# Patient Record
Sex: MALE | Race: WHITE | Employment: FULL TIME | URBAN - NONMETROPOLITAN AREA
[De-identification: names, ages, dates, MRNs, and addresses within clinical notes are randomized per-mention and may not be internally consistent; named-entity substitution may affect disease eponyms.]

---

## 2017-07-17 ENCOUNTER — TELEPHONE (OUTPATIENT)
Dept: CARDIOLOGY | Age: 62
End: 2017-07-17

## 2017-07-18 ENCOUNTER — TELEPHONE (OUTPATIENT)
Dept: CARDIOLOGY | Age: 62
End: 2017-07-18

## 2017-08-03 ENCOUNTER — OFFICE VISIT (OUTPATIENT)
Dept: CARDIOLOGY | Age: 62
End: 2017-08-03
Payer: COMMERCIAL

## 2017-08-03 VITALS
HEART RATE: 70 BPM | WEIGHT: 251 LBS | DIASTOLIC BLOOD PRESSURE: 70 MMHG | SYSTOLIC BLOOD PRESSURE: 120 MMHG | BODY MASS INDEX: 38.04 KG/M2 | HEIGHT: 68 IN

## 2017-08-03 DIAGNOSIS — I51.7 LEFT VENTRICULAR HYPERTROPHY: Primary | ICD-10-CM

## 2017-08-03 DIAGNOSIS — I51.89 DIASTOLIC DYSFUNCTION: ICD-10-CM

## 2017-08-03 DIAGNOSIS — I10 ESSENTIAL HYPERTENSION: ICD-10-CM

## 2017-08-03 PROCEDURE — 3017F COLORECTAL CA SCREEN DOC REV: CPT | Performed by: INTERNAL MEDICINE

## 2017-08-03 PROCEDURE — 1036F TOBACCO NON-USER: CPT | Performed by: INTERNAL MEDICINE

## 2017-08-03 PROCEDURE — G8427 DOCREV CUR MEDS BY ELIG CLIN: HCPCS | Performed by: INTERNAL MEDICINE

## 2017-08-03 PROCEDURE — 99213 OFFICE O/P EST LOW 20 MIN: CPT | Performed by: INTERNAL MEDICINE

## 2017-08-03 PROCEDURE — G8598 ASA/ANTIPLAT THER USED: HCPCS | Performed by: INTERNAL MEDICINE

## 2017-08-03 PROCEDURE — G8417 CALC BMI ABV UP PARAM F/U: HCPCS | Performed by: INTERNAL MEDICINE

## 2018-02-06 ENCOUNTER — TELEPHONE (OUTPATIENT)
Dept: CARDIOLOGY | Age: 63
End: 2018-02-06

## 2018-02-07 ENCOUNTER — OFFICE VISIT (OUTPATIENT)
Dept: CARDIOLOGY | Age: 63
End: 2018-02-07
Payer: COMMERCIAL

## 2018-02-07 VITALS
HEART RATE: 71 BPM | DIASTOLIC BLOOD PRESSURE: 88 MMHG | BODY MASS INDEX: 38.8 KG/M2 | HEIGHT: 68 IN | SYSTOLIC BLOOD PRESSURE: 138 MMHG | WEIGHT: 256 LBS

## 2018-02-07 DIAGNOSIS — I10 ESSENTIAL HYPERTENSION: Primary | ICD-10-CM

## 2018-02-07 DIAGNOSIS — I51.89 DIASTOLIC DYSFUNCTION: ICD-10-CM

## 2018-02-07 DIAGNOSIS — I51.7 LEFT VENTRICULAR HYPERTROPHY: ICD-10-CM

## 2018-02-07 PROCEDURE — 3017F COLORECTAL CA SCREEN DOC REV: CPT | Performed by: CLINICAL NURSE SPECIALIST

## 2018-02-07 PROCEDURE — 1036F TOBACCO NON-USER: CPT | Performed by: CLINICAL NURSE SPECIALIST

## 2018-02-07 PROCEDURE — G8427 DOCREV CUR MEDS BY ELIG CLIN: HCPCS | Performed by: CLINICAL NURSE SPECIALIST

## 2018-02-07 PROCEDURE — G8598 ASA/ANTIPLAT THER USED: HCPCS | Performed by: CLINICAL NURSE SPECIALIST

## 2018-02-07 PROCEDURE — G8417 CALC BMI ABV UP PARAM F/U: HCPCS | Performed by: CLINICAL NURSE SPECIALIST

## 2018-02-07 PROCEDURE — 93000 ELECTROCARDIOGRAM COMPLETE: CPT | Performed by: CLINICAL NURSE SPECIALIST

## 2018-02-07 PROCEDURE — G8484 FLU IMMUNIZE NO ADMIN: HCPCS | Performed by: CLINICAL NURSE SPECIALIST

## 2018-02-07 PROCEDURE — 99213 OFFICE O/P EST LOW 20 MIN: CPT | Performed by: CLINICAL NURSE SPECIALIST

## 2018-02-07 RX ORDER — GLIPIZIDE AND METFORMIN HCL 5; 500 MG/1; MG/1
1 TABLET, FILM COATED ORAL 2 TIMES DAILY
COMMUNITY

## 2018-02-07 NOTE — PATIENT INSTRUCTIONS
Follow up in Aug With Dr. Derrell Kamara  Work on better diabetes control  Call with any questions or concerns  Follow up with [facility] for non cardiac problems  Report any new problems  Cardiovascular Fitness-Exercise as tolerated. Strive for 30 minutes of exercise most days of the week. Cardiac / Healthy Diet  Continue current medications as directed  Continue plan of treatment  It is always recommended that you bring your medications bottles with you to each visit - this is for your safety! Patient Education        Learning About Diabetes and Coronary Artery Disease  How are diabetes and heart disease connected? Many people think diabetes and heart disease go hand in hand. But having diabetes doesn't have to mean that you are going to have a heart attack someday. Healthy living can help prevent many of the problems that come with both diabetes and heart disease. For some people, diabetes can cause problems in your body that may lead to heart disease. Diabetes can make the problems of heart disease worse. Experts do not fully understand how diabetes affects the heart. Many things can lead to heart disease, including high blood sugar, insulin resistance, high cholesterol, and high blood pressure. But lifestyle and genetics may also affect a person's risk. But here's the good news: The good things you're doing to stay healthy with diabetes-eating healthy foods, quitting smoking, getting exercise and more-are also helping your heart. What increases your risk for heart disease? When you have diabetes, your risk for heart disease is even higher if you have:  · High blood pressure, which pushes blood through the arteries with too much force. Over time, this damages the walls of the arteries. · High cholesterol, which causes the buildup of a kind of fat inside the blood vessel walls. This buildup can lower blood flow to the heart muscle and raise your risk for having a heart attack.   · Kidney damage,

## 2018-07-02 ENCOUNTER — TELEPHONE (OUTPATIENT)
Dept: CARDIOLOGY | Age: 63
End: 2018-07-02

## 2018-07-02 NOTE — TELEPHONE ENCOUNTER
Unable to leave msg or speak w pt in regards to Dr Belinda Mayer retiring and appt w Belinda Mayer on 8-8-2018 needing to be rescheduled w a [facility].  If pt does not call back later on today, will try calling pt again tomorrow 7-3-2018 to renee [place]

## 2018-07-03 ENCOUNTER — TELEPHONE (OUTPATIENT)
Dept: CARDIOLOGY | Age: 63
End: 2018-07-03

## 2018-07-03 NOTE — TELEPHONE ENCOUNTER
Unable to leave msg on pt phone, pt phone is no longer working and on the other mobile device voicemail is not set up. Called pt in regards to appt brianda Post on 8-8-2018 needing to be rescheduled w MAE due to [facility].  If pt does not call back today on 7-3-2018 will call pt back on 7-5-2018    rubén

## 2018-07-09 ENCOUNTER — TELEPHONE (OUTPATIENT)
Dept: CARDIOLOGY | Age: 63
End: 2018-07-09

## 2023-01-07 ENCOUNTER — APPOINTMENT (OUTPATIENT)
Dept: GENERAL RADIOLOGY | Age: 68
DRG: 682 | End: 2023-01-07
Payer: MEDICARE

## 2023-01-07 ENCOUNTER — HOSPITAL ENCOUNTER (INPATIENT)
Age: 68
LOS: 2 days | Discharge: HOME HEALTH | DRG: 682 | End: 2023-01-10
Attending: EMERGENCY MEDICINE | Admitting: STUDENT IN AN ORGANIZED HEALTH CARE EDUCATION/TRAINING PROGRAM
Payer: MEDICARE

## 2023-01-07 DIAGNOSIS — U07.1 COVID-19 VIRUS INFECTION: Primary | ICD-10-CM

## 2023-01-07 DIAGNOSIS — N17.9 AKI (ACUTE KIDNEY INJURY) (HCC): ICD-10-CM

## 2023-01-07 DIAGNOSIS — R17 ELEVATED BILIRUBIN: ICD-10-CM

## 2023-01-07 LAB
BASOPHILS ABSOLUTE: 0 K/UL (ref 0–0.2)
BASOPHILS RELATIVE PERCENT: 0.3 % (ref 0–1)
EOSINOPHILS ABSOLUTE: 0 K/UL (ref 0–0.6)
EOSINOPHILS RELATIVE PERCENT: 0 % (ref 0–5)
GLUCOSE BLD-MCNC: 61 MG/DL (ref 70–99)
HCT VFR BLD CALC: 44 % (ref 42–52)
HEMOGLOBIN: 15 G/DL (ref 14–18)
IMMATURE GRANULOCYTES #: 0.1 K/UL
LYMPHOCYTES ABSOLUTE: 0.6 K/UL (ref 1.1–4.5)
LYMPHOCYTES RELATIVE PERCENT: 4.8 % (ref 20–40)
MCH RBC QN AUTO: 32.4 PG (ref 27–31)
MCHC RBC AUTO-ENTMCNC: 34.1 G/DL (ref 33–37)
MCV RBC AUTO: 95 FL (ref 80–94)
MONOCYTES ABSOLUTE: 1.2 K/UL (ref 0–0.9)
MONOCYTES RELATIVE PERCENT: 9.8 % (ref 0–10)
NEUTROPHILS ABSOLUTE: 9.8 K/UL (ref 1.5–7.5)
NEUTROPHILS RELATIVE PERCENT: 83.9 % (ref 50–65)
PDW BLD-RTO: 17.2 % (ref 11.5–14.5)
PERFORMED ON: ABNORMAL
PLATELET # BLD: 158 K/UL (ref 130–400)
PMV BLD AUTO: 9.9 FL (ref 9.4–12.4)
RBC # BLD: 4.63 M/UL (ref 4.7–6.1)
WBC # BLD: 11.7 K/UL (ref 4.8–10.8)

## 2023-01-07 PROCEDURE — 99285 EMERGENCY DEPT VISIT HI MDM: CPT

## 2023-01-07 PROCEDURE — 71045 X-RAY EXAM CHEST 1 VIEW: CPT

## 2023-01-07 PROCEDURE — 93005 ELECTROCARDIOGRAM TRACING: CPT | Performed by: EMERGENCY MEDICINE

## 2023-01-07 RX ORDER — METHYLPREDNISOLONE 4 MG/1
4 TABLET ORAL SEE ADMIN INSTRUCTIONS
Status: ON HOLD | COMMUNITY
End: 2023-01-10 | Stop reason: HOSPADM

## 2023-01-07 RX ORDER — DULAGLUTIDE 1.5 MG/.5ML
1.5 INJECTION, SOLUTION SUBCUTANEOUS WEEKLY
COMMUNITY

## 2023-01-07 RX ORDER — PREDNISONE 1 MG/1
5 TABLET ORAL DAILY
Status: ON HOLD | COMMUNITY
End: 2023-01-10 | Stop reason: HOSPADM

## 2023-01-07 RX ORDER — VALSARTAN 40 MG/1
40 TABLET ORAL DAILY
COMMUNITY

## 2023-01-07 RX ORDER — ALLOPURINOL 300 MG/1
300 TABLET ORAL DAILY
COMMUNITY

## 2023-01-07 RX ORDER — AZITHROMYCIN 250 MG/1
250 TABLET, FILM COATED ORAL DAILY
Status: ON HOLD | COMMUNITY
End: 2023-01-10 | Stop reason: HOSPADM

## 2023-01-07 ASSESSMENT — PAIN - FUNCTIONAL ASSESSMENT: PAIN_FUNCTIONAL_ASSESSMENT: NONE - DENIES PAIN

## 2023-01-08 ENCOUNTER — APPOINTMENT (OUTPATIENT)
Dept: CT IMAGING | Age: 68
DRG: 682 | End: 2023-01-08
Payer: MEDICARE

## 2023-01-08 PROBLEM — R74.01 TRANSAMINITIS: Status: ACTIVE | Noted: 2023-01-08

## 2023-01-08 LAB
ALBUMIN SERPL-MCNC: 2.8 G/DL (ref 3.5–5.2)
ALBUMIN SERPL-MCNC: 3.2 G/DL (ref 3.5–5.2)
ALP BLD-CCNC: 170 U/L (ref 40–130)
ALP BLD-CCNC: 186 U/L (ref 40–130)
ALT SERPL-CCNC: 70 U/L (ref 5–41)
ALT SERPL-CCNC: 76 U/L (ref 5–41)
ANION GAP SERPL CALCULATED.3IONS-SCNC: 11 MMOL/L (ref 7–19)
ANION GAP SERPL CALCULATED.3IONS-SCNC: 11 MMOL/L (ref 7–19)
AST SERPL-CCNC: 114 U/L (ref 5–40)
AST SERPL-CCNC: 96 U/L (ref 5–40)
BASE EXCESS ARTERIAL: 5.2 MMOL/L (ref -2–2)
BILIRUB SERPL-MCNC: 2.4 MG/DL (ref 0.2–1.2)
BILIRUB SERPL-MCNC: 2.8 MG/DL (ref 0.2–1.2)
BILIRUBIN URINE: NEGATIVE
BLOOD, URINE: NEGATIVE
BUN BLDV-MCNC: 32 MG/DL (ref 8–23)
BUN BLDV-MCNC: 35 MG/DL (ref 8–23)
CALCIUM SERPL-MCNC: 8.4 MG/DL (ref 8.8–10.2)
CALCIUM SERPL-MCNC: 8.8 MG/DL (ref 8.8–10.2)
CARBOXYHEMOGLOBIN ARTERIAL: 1.5 % (ref 0–5)
CHLORIDE BLD-SCNC: 101 MMOL/L (ref 98–111)
CHLORIDE BLD-SCNC: 101 MMOL/L (ref 98–111)
CLARITY: CLEAR
CO2: 28 MMOL/L (ref 22–29)
CO2: 29 MMOL/L (ref 22–29)
COLOR: YELLOW
CREAT SERPL-MCNC: 1.2 MG/DL (ref 0.5–1.2)
CREAT SERPL-MCNC: 1.3 MG/DL (ref 0.5–1.2)
FERRITIN: 280.1 NG/ML (ref 30–400)
GFR SERPL CREATININE-BSD FRML MDRD: >60 ML/MIN/{1.73_M2}
GFR SERPL CREATININE-BSD FRML MDRD: >60 ML/MIN/{1.73_M2}
GLUCOSE BLD-MCNC: 107 MG/DL (ref 74–109)
GLUCOSE BLD-MCNC: 132 MG/DL (ref 70–99)
GLUCOSE BLD-MCNC: 48 MG/DL (ref 70–99)
GLUCOSE BLD-MCNC: 61 MG/DL (ref 74–109)
GLUCOSE BLD-MCNC: 79 MG/DL (ref 70–99)
GLUCOSE BLD-MCNC: 93 MG/DL (ref 70–99)
GLUCOSE URINE: NEGATIVE MG/DL
HBA1C MFR BLD: 5.6 % (ref 4–6)
HCO3 ARTERIAL: 29 MMOL/L (ref 22–26)
HEMOGLOBIN, ART, EXTENDED: 14.2 G/DL (ref 14–18)
KETONES, URINE: NEGATIVE MG/DL
LEUKOCYTE ESTERASE, URINE: NEGATIVE
METHEMOGLOBIN ARTERIAL: 1 %
NITRITE, URINE: NEGATIVE
O2 CONTENT ARTERIAL: 18.2 ML/DL
O2 SAT, ARTERIAL: 91.3 %
O2 THERAPY: ABNORMAL
PCO2 ARTERIAL: 39 MMHG (ref 35–45)
PERFORMED ON: ABNORMAL
PERFORMED ON: ABNORMAL
PERFORMED ON: NORMAL
PERFORMED ON: NORMAL
PH ARTERIAL: 7.48 (ref 7.35–7.45)
PH UA: 6.5 (ref 5–8)
PO2 ARTERIAL: 64 MMHG (ref 80–100)
POTASSIUM SERPL-SCNC: 3.5 MMOL/L (ref 3.5–5)
POTASSIUM SERPL-SCNC: 4 MMOL/L (ref 3.5–5)
POTASSIUM, WHOLE BLOOD: 3.7
PRO-BNP: 669 PG/ML (ref 0–900)
PROCALCITONIN: 0.34 NG/ML (ref 0–0.09)
PROTEIN UA: NEGATIVE MG/DL
SAMPLE SOURCE: ABNORMAL
SARS-COV-2, NAAT: DETECTED
SODIUM BLD-SCNC: 140 MMOL/L (ref 136–145)
SODIUM BLD-SCNC: 141 MMOL/L (ref 136–145)
SPECIFIC GRAVITY UA: 1.03 (ref 1–1.03)
TOTAL PROTEIN: 5.6 G/DL (ref 6.6–8.7)
TOTAL PROTEIN: 7 G/DL (ref 6.6–8.7)
UROBILINOGEN, URINE: 1 E.U./DL
VITAMIN D 25-HYDROXY: 28.5 NG/ML

## 2023-01-08 PROCEDURE — 94760 N-INVAS EAR/PLS OXIMETRY 1: CPT

## 2023-01-08 PROCEDURE — 36600 WITHDRAWAL OF ARTERIAL BLOOD: CPT

## 2023-01-08 PROCEDURE — 6370000000 HC RX 637 (ALT 250 FOR IP): Performed by: HOSPITALIST

## 2023-01-08 PROCEDURE — 6360000002 HC RX W HCPCS: Performed by: HOSPITALIST

## 2023-01-08 PROCEDURE — 82728 ASSAY OF FERRITIN: CPT

## 2023-01-08 PROCEDURE — 85025 COMPLETE CBC W/AUTO DIFF WBC: CPT

## 2023-01-08 PROCEDURE — 83036 HEMOGLOBIN GLYCOSYLATED A1C: CPT

## 2023-01-08 PROCEDURE — 84145 PROCALCITONIN (PCT): CPT

## 2023-01-08 PROCEDURE — 6360000004 HC RX CONTRAST MEDICATION: Performed by: EMERGENCY MEDICINE

## 2023-01-08 PROCEDURE — 82947 ASSAY GLUCOSE BLOOD QUANT: CPT

## 2023-01-08 PROCEDURE — 2580000003 HC RX 258: Performed by: EMERGENCY MEDICINE

## 2023-01-08 PROCEDURE — 1210000000 HC MED SURG R&B

## 2023-01-08 PROCEDURE — 82803 BLOOD GASES ANY COMBINATION: CPT

## 2023-01-08 PROCEDURE — 74177 CT ABD & PELVIS W/CONTRAST: CPT

## 2023-01-08 PROCEDURE — 83880 ASSAY OF NATRIURETIC PEPTIDE: CPT

## 2023-01-08 PROCEDURE — 36415 COLL VENOUS BLD VENIPUNCTURE: CPT

## 2023-01-08 PROCEDURE — 2580000003 HC RX 258: Performed by: HOSPITALIST

## 2023-01-08 PROCEDURE — 87635 SARS-COV-2 COVID-19 AMP PRB: CPT

## 2023-01-08 PROCEDURE — 80053 COMPREHEN METABOLIC PANEL: CPT

## 2023-01-08 PROCEDURE — 82306 VITAMIN D 25 HYDROXY: CPT

## 2023-01-08 PROCEDURE — 81003 URINALYSIS AUTO W/O SCOPE: CPT

## 2023-01-08 RX ORDER — SODIUM CHLORIDE 9 MG/ML
INJECTION, SOLUTION INTRAVENOUS PRN
Status: DISCONTINUED | OUTPATIENT
Start: 2023-01-08 | End: 2023-01-10 | Stop reason: HOSPADM

## 2023-01-08 RX ORDER — POLYETHYLENE GLYCOL 3350 17 G/17G
17 POWDER, FOR SOLUTION ORAL DAILY PRN
Status: DISCONTINUED | OUTPATIENT
Start: 2023-01-08 | End: 2023-01-10 | Stop reason: HOSPADM

## 2023-01-08 RX ORDER — ACETAMINOPHEN 650 MG/1
650 SUPPOSITORY RECTAL EVERY 4 HOURS PRN
Status: DISCONTINUED | OUTPATIENT
Start: 2023-01-08 | End: 2023-01-10 | Stop reason: HOSPADM

## 2023-01-08 RX ORDER — ONDANSETRON 2 MG/ML
4 INJECTION INTRAMUSCULAR; INTRAVENOUS EVERY 6 HOURS PRN
Status: DISCONTINUED | OUTPATIENT
Start: 2023-01-08 | End: 2023-01-10 | Stop reason: HOSPADM

## 2023-01-08 RX ORDER — CALCIUM CARBONATE 200(500)MG
500 TABLET,CHEWABLE ORAL 3 TIMES DAILY PRN
Status: DISCONTINUED | OUTPATIENT
Start: 2023-01-08 | End: 2023-01-10 | Stop reason: HOSPADM

## 2023-01-08 RX ORDER — ACETAMINOPHEN 325 MG/1
650 TABLET ORAL EVERY 4 HOURS PRN
Status: DISCONTINUED | OUTPATIENT
Start: 2023-01-08 | End: 2023-01-10 | Stop reason: HOSPADM

## 2023-01-08 RX ORDER — GUAIFENESIN/DEXTROMETHORPHAN 100-10MG/5
10 SYRUP ORAL EVERY 4 HOURS PRN
Status: DISCONTINUED | OUTPATIENT
Start: 2023-01-08 | End: 2023-01-10 | Stop reason: HOSPADM

## 2023-01-08 RX ORDER — SODIUM CHLORIDE, SODIUM LACTATE, POTASSIUM CHLORIDE, AND CALCIUM CHLORIDE .6; .31; .03; .02 G/100ML; G/100ML; G/100ML; G/100ML
1000 INJECTION, SOLUTION INTRAVENOUS ONCE
Status: COMPLETED | OUTPATIENT
Start: 2023-01-08 | End: 2023-01-08

## 2023-01-08 RX ORDER — COLCHICINE 0.6 MG/1
0.6 TABLET ORAL DAILY
Status: DISCONTINUED | OUTPATIENT
Start: 2023-01-08 | End: 2023-01-08

## 2023-01-08 RX ORDER — ONDANSETRON 4 MG/1
4 TABLET, ORALLY DISINTEGRATING ORAL EVERY 8 HOURS PRN
Status: DISCONTINUED | OUTPATIENT
Start: 2023-01-08 | End: 2023-01-10 | Stop reason: HOSPADM

## 2023-01-08 RX ORDER — GLIPIZIDE 5 MG/1
5 TABLET ORAL
Status: DISCONTINUED | OUTPATIENT
Start: 2023-01-08 | End: 2023-01-09

## 2023-01-08 RX ORDER — MECOBALAMIN 5000 MCG
5 TABLET,DISINTEGRATING ORAL NIGHTLY PRN
Status: DISCONTINUED | OUTPATIENT
Start: 2023-01-08 | End: 2023-01-10 | Stop reason: HOSPADM

## 2023-01-08 RX ORDER — ENOXAPARIN SODIUM 100 MG/ML
30 INJECTION SUBCUTANEOUS 2 TIMES DAILY
Status: DISCONTINUED | OUTPATIENT
Start: 2023-01-08 | End: 2023-01-10 | Stop reason: HOSPADM

## 2023-01-08 RX ORDER — DEXTROSE MONOHYDRATE 100 MG/ML
INJECTION, SOLUTION INTRAVENOUS CONTINUOUS PRN
Status: DISCONTINUED | OUTPATIENT
Start: 2023-01-08 | End: 2023-01-10 | Stop reason: HOSPADM

## 2023-01-08 RX ORDER — VALSARTAN 40 MG/1
40 TABLET ORAL DAILY
Status: DISCONTINUED | OUTPATIENT
Start: 2023-01-08 | End: 2023-01-10 | Stop reason: HOSPADM

## 2023-01-08 RX ORDER — SODIUM CHLORIDE 0.9 % (FLUSH) 0.9 %
5-40 SYRINGE (ML) INJECTION PRN
Status: DISCONTINUED | OUTPATIENT
Start: 2023-01-08 | End: 2023-01-10 | Stop reason: HOSPADM

## 2023-01-08 RX ORDER — CLONIDINE HYDROCHLORIDE 0.1 MG/1
0.2 TABLET ORAL 3 TIMES DAILY
Status: DISCONTINUED | OUTPATIENT
Start: 2023-01-08 | End: 2023-01-10 | Stop reason: HOSPADM

## 2023-01-08 RX ORDER — GLIPIZIDE AND METFORMIN HCL 5; 500 MG/1; MG/1
1 TABLET, FILM COATED ORAL 2 TIMES DAILY
Status: DISCONTINUED | OUTPATIENT
Start: 2023-01-08 | End: 2023-01-08 | Stop reason: SDUPTHER

## 2023-01-08 RX ORDER — ALLOPURINOL 100 MG/1
300 TABLET ORAL DAILY
Status: DISCONTINUED | OUTPATIENT
Start: 2023-01-08 | End: 2023-01-10 | Stop reason: HOSPADM

## 2023-01-08 RX ORDER — METOPROLOL TARTRATE 50 MG/1
200 TABLET, FILM COATED ORAL 2 TIMES DAILY
Status: DISCONTINUED | OUTPATIENT
Start: 2023-01-08 | End: 2023-01-10 | Stop reason: HOSPADM

## 2023-01-08 RX ORDER — ATORVASTATIN CALCIUM 20 MG/1
20 TABLET, FILM COATED ORAL DAILY
Status: DISCONTINUED | OUTPATIENT
Start: 2023-01-08 | End: 2023-01-08

## 2023-01-08 RX ORDER — SODIUM CHLORIDE 0.9 % (FLUSH) 0.9 %
5-40 SYRINGE (ML) INJECTION EVERY 12 HOURS SCHEDULED
Status: DISCONTINUED | OUTPATIENT
Start: 2023-01-08 | End: 2023-01-10 | Stop reason: HOSPADM

## 2023-01-08 RX ORDER — AMLODIPINE BESYLATE 10 MG/1
10 TABLET ORAL DAILY
Status: DISCONTINUED | OUTPATIENT
Start: 2023-01-08 | End: 2023-01-10 | Stop reason: HOSPADM

## 2023-01-08 RX ADMIN — SODIUM CHLORIDE, PRESERVATIVE FREE 10 ML: 5 INJECTION INTRAVENOUS at 20:23

## 2023-01-08 RX ADMIN — SODIUM CHLORIDE, POTASSIUM CHLORIDE, SODIUM LACTATE AND CALCIUM CHLORIDE 1000 ML: 600; 310; 30; 20 INJECTION, SOLUTION INTRAVENOUS at 00:25

## 2023-01-08 RX ADMIN — CLONIDINE HYDROCHLORIDE 0.2 MG: 0.1 TABLET ORAL at 22:08

## 2023-01-08 RX ADMIN — IOPAMIDOL 75 ML: 755 INJECTION, SOLUTION INTRAVENOUS at 01:13

## 2023-01-08 RX ADMIN — CLONIDINE HYDROCHLORIDE 0.2 MG: 0.1 TABLET ORAL at 14:32

## 2023-01-08 RX ADMIN — SODIUM CHLORIDE, PRESERVATIVE FREE 10 ML: 5 INJECTION INTRAVENOUS at 10:26

## 2023-01-08 RX ADMIN — METOPROLOL TARTRATE 200 MG: 50 TABLET, FILM COATED ORAL at 20:24

## 2023-01-08 RX ADMIN — ENOXAPARIN SODIUM 30 MG: 100 INJECTION SUBCUTANEOUS at 20:24

## 2023-01-08 RX ADMIN — ALLOPURINOL 300 MG: 100 TABLET ORAL at 10:23

## 2023-01-08 RX ADMIN — AMLODIPINE BESYLATE 10 MG: 10 TABLET ORAL at 10:23

## 2023-01-08 RX ADMIN — CLONIDINE HYDROCHLORIDE 0.2 MG: 0.1 TABLET ORAL at 10:23

## 2023-01-08 RX ADMIN — GLIPIZIDE 5 MG: 5 TABLET ORAL at 10:23

## 2023-01-08 RX ADMIN — METOPROLOL TARTRATE 200 MG: 50 TABLET, FILM COATED ORAL at 10:23

## 2023-01-08 RX ADMIN — METFORMIN HYDROCHLORIDE 500 MG: 500 TABLET ORAL at 10:23

## 2023-01-08 RX ADMIN — ENOXAPARIN SODIUM 30 MG: 100 INJECTION SUBCUTANEOUS at 10:24

## 2023-01-08 RX ADMIN — VALSARTAN 40 MG: 40 TABLET ORAL at 10:23

## 2023-01-08 ASSESSMENT — ENCOUNTER SYMPTOMS
COUGH: 1
VOMITING: 0
SHORTNESS OF BREATH: 1
NAUSEA: 0
SHORTNESS OF BREATH: 0
DIARRHEA: 0

## 2023-01-08 NOTE — H&P
[facility] History and Physical    Patient Information:  Patient: Milton Koehelr  MRN: [id number]   Acct: [de-identified]  YOB: 1955  Admit Date: 1/8/2023   Primary Care Physician: Dolores Chavez  Advance Directive: Full Code  Health Care Proxy: his wife, Mrs. Bonita Freitas, [phone]        SUBJECTIVE:    Chief Complaint   Patient presents with    Shortness of Breath     Covid + 2 weeks ago, family reports shortness of breath and increased weakness     EP Sign Out:  73 yo man  Satting 91% on RA  Bili was up to 2.8  LFTs to low 100s  CT showed no  pericholecystic fluid, but thickened wall - Rds recommended US  Cr is at baseline 1.3     HPI:  Mr. Milton Koehler is a pleasant 74year old  [place] man from home. He states that this is the thirteenth day that he has been sick, He had been starting to feel better but then got worse. He was never vaccinated for COVID. He states that he had first feeling sick 15 cortney\ys go which was Dec 27th. He was first tested for COVID here tonight. His wife states that he refused to let his wife test him at home. He states that he has no home oximeter. He states that that is all there is to tell. Was 91& on AR in ED but wa sent up on O2. Stayed 94-95% for me on RA. Does not have hypoxemia or need steroids at this time. Review of Systems:   Review of Systems   Constitutional:  Positive for chills and fatigue. Negative for diaphoresis and fever. Respiratory:  Negative for shortness of breath. Cardiovascular:  Negative for chest pain. Gastrointestinal:  Negative for diarrhea, nausea and vomiting. Musculoskeletal:  Negative for arthralgias and myalgias. Neurological:  Positive for weakness. ENDORSES degussia, ENDORSES anosmia   Psychiatric/Behavioral:  Negative for confusion.       Past Medical History:   Diagnosis Date    AA (alcohol abuse)     since 1994    Chronic kidney disease (CKD), stage III (moderate) ([facility] 06/14/2011    sees Dr. Dylan Solano    COVID-19 12/27/2022    Diabetes mellitus ([facility]     NIDDM    Diastolic CHF ([facility] 96/09/2440    History of CVA (cerebrovascular accident) 06/14/2011    Hyperkalemia 06/14/2011    secondary to aldactone and zestril    Hyperlipemia     cholesterol management, Royer Poole APRN    Hypertension     Left ventricular hypertrophy 06/14/2011    Lung nodule     calcified nodule RML    Tobacco abuse, in remission     since 1994    Vertebral artery stenosis 06/14/2011    Vitamin D deficiency      Past Psychiatric History:  Denies any    Past Surgical History:  Never had surgery  History reviewed. No pertinent surgical history. Social History       Tobacco History       Smoking Status  Former Smoking Start Date  7/23/1973 Quit Date  7/23/1994 Smoking Frequency  1 pack/day for 21.00 years (21.00 pk-yrs)    Smoking Tobacco Type  Cigarettes from 7/23/1973 to 7/23/1994      Smokeless Tobacco Use  Former              Alcohol History       Alcohol Use Status  Not Currently Comment  was a social drinker              Drug Use       Drug Use Status  Never              Sexual Activity       Sexually Active  Not Asked Comment  has 2 sons, here with his wife             CODE STATUS: Full Code  HEALTH CARE PROXY: his wife, Mrs. Anton Rossi, [phone]  AMBULATES: usually uses a walker  DOMICILED: has no steps in the  home, has a step to the front porch and a ramp on the patio     Family History   Problem Relation Age of Onset    Kidney Disease Mother     Hypertension Mother     Alcohol Abuse Father     Cancer Brother         smoker    Diabetes Brother     No Known Problems Son     No Known Problems Son      Allergies   Allergen Reactions    Lisinopril Cough     Home Medications:  Prior to Admission medications    Medication Sig Start Date End Date Taking?  Authorizing Provider   predniSONE (DELTASONE) 5 MG tablet Take 5 mg by mouth daily Daily as needed   Yes Historical Provider, MD   COLCHICINE PO Take 0.6 mg by mouth   Yes Historical Provider, MD   dulaglutide (TRULICITY) 1.5 PQ/3.2KA SC injection Inject 1.5 mg into the skin once a week   Yes Historical Provider, MD   allopurinol (ZYLOPRIM) 300 MG tablet Take 300 mg by mouth daily   Yes Historical Provider, MD   valsartan (DIOVAN) 40 MG tablet Take 40 mg by mouth daily   Yes Historical Provider, MD   azithromycin (ZITHROMAX) 250 MG tablet Take 250 mg by mouth daily 2 tablets on first day, then 1 daily for 4 days   Yes Historical Provider, MD   methylPREDNISolone (MEDROL DOSEPACK) 4 MG tablet Take 4 mg by mouth See Admin Instructions 6 tablets day 1, decrease by 1 tab daily for total of 6 days   Yes Historical Provider, MD   ferrous gluconate (FERGON) 225 (27 Fe) MG tablet Take by mouth daily  Patient not taking: Reported on 1/7/2023    Historical Provider, MD   glipiZIDE-metformin (METAGLIP) 5-500 MG per tablet Take 1 tablet by mouth 2 times daily    Historical Provider, MD   atorvastatin (LIPITOR) 20 MG tablet Take 1 tablet by mouth daily 6/21/16   FADIA Payne   metoprolol (LOPRESSOR) 100 MG tablet Take 100 mg by mouth Take 2 tablets twice a day    Historical Provider, MD   indomethacin (INDOCIN) 50 MG capsule   Take 50 mg by mouth 3 times daily as needed   Patient not taking: Reported on 1/7/2023 1/19/15   Historical Provider, MD   losartan (COZAAR) 25 MG tablet Take 1 tablet by mouth daily. 1/25/12 1/24/13  FADIA La   amlodipine (NORVASC) 10 MG tablet Take 10 mg by mouth daily. Historical Provider, MD   clonidine (CATAPRES) 0.2 MG tablet Take 0.2 mg by mouth three times daily.       Historical Provider, MD         OBJECTIVE:    Vitals:    01/08/23 0648   BP: (!) 142/71   Pulse: 77   Resp: 22   Temp: 98.1 °F (36.7 °C)   SpO2: 91%   Breathing in NC when seen on zee    BP (!) 142/71   Pulse 77   Temp 98.1 °F (36.7 °C)   Resp 22   Ht 5' 8\" (1.727 m)   Wt 194 lb (88 kg)   SpO2 91%   BMI 29.50 kg/m²     No intake or output data in the 24 hours ending 01/08/23 0658    Physical Exam  Vitals reviewed. Constitutional:       General: He is not in acute distress. Appearance: Normal appearance. He is ill-appearing. He is not toxic-appearing. HENT:      Head: Normocephalic and atraumatic. Nose: No congestion or rhinorrhea. Eyes:      General:         Right eye: No discharge. Left eye: No discharge. Neck:      Comments: Supple, trachea appears midline  Cardiovascular:      Rate and Rhythm: Normal rate and regular rhythm. Heart sounds: No murmur heard. No friction rub. No gallop. Pulmonary:      Effort: Pulmonary effort is normal. No respiratory distress. Breath sounds: No stridor. No wheezing, rhonchi or rales. Chest:      Chest wall: No tenderness. Abdominal:      General: Bowel sounds are normal.      Palpations: Abdomen is soft. Tenderness: There is no abdominal tenderness. There is no guarding or rebound. Musculoskeletal:      Right lower leg: No edema. Left lower leg: No edema. Skin:     General: Skin is warm. Comments: nondiaphoretic   Neurological:      Mental Status: He is alert. Motor: No tremor or seizure activity.    Psychiatric:         Mood and Affect: Mood normal.         Behavior: Behavior normal.       LABORATORY DATA:    CBC:   Recent Labs     01/07/23 2350   WBC 11.7*   HGB 15.0   HCT 44.0        BMP:   Recent Labs     01/07/23 2350 01/08/23 0041     --    K 4.0 3.7     --    CO2 29  --    BUN 35*  --    CREATININE 1.3*  --    CALCIUM 8.8  --      Hepatic Profile:   Recent Labs     01/07/23 2350   *   ALT 76*   BILITOT 2.8*   ALKPHOS 186*     Pro-BNP:   Recent Labs     01/07/23 2350   PROBNP 669     ABG:   Recent Labs     01/08/23  0041   PHART 7.480*   QUK1FQJ 39.0   PO2ART 64.0*   YRJ3TWL 29.0*   BEART 5.2*   HGBAE 14.2   D3MCQECW 91.3   CARBOXHGBART 1.5     IMAGING:  CT ABDOMEN PELVIS W IV CONTRAST Additional Contrast? None  Result Date: 1/8/2023  There is some thickening of the gallbladder wall. No calcified stones. Further evaluation, consider correlation with ultrasound. There is a small amount of ascites present around the liver and in the pelvis. Electronically signed by Maryjane Palomo MD on 01-08-23 at [facility]  Result Date: 1/8/2023  There is evidence for old granulomatous disease. There is elevation left hemidiaphragm. Electronically signed by Maryjane Palomo MD on 01-08-23 at [facility]:    COVID-19/SARS-2 Infection:  Admit to the [address] [place] [address] zee / unit under hopsitalist team  Labs as per COVID protocols (CMP daily, CBC with Diff daily, CRP daily x3, Ddimer Daily x3, Vit D 25, Procal once on arrival and again two days later in the AM)  Decadron not indicated as was 91% on RA in the ED and has been 94-95% on RA on the zee so far  \"PPE Instructions\" and \"Droplet +\" Precautions as per COVID protocol  AC with Lovenox as per COVID protocol  would provide Famotidine 20mg PO BID while on steroid IF was indicated  Oxygen as per protocol  Remdesvir 200mg on day one, followed by 4 daily doses of 100mg from the next day, would be considered IF their CrCl>30 and ALSO on supplemental O2 above baseline, BUT not on HHFNC nor NIPPV nor Ventilated, AND has had COVID for less than a week   Baricitinib would be considered IF on supplemental O2 <6LPM and CRP >7.5   Actrema would be considered IF was on intensive O2 and if CRP>7.5   Vitamin D & Vitamin C & Zinc will NOT be provided for COVID as there is no shown benefit but may be provided for other reasons  Ivermectin will NOT be provided for COVID patients as it has shown worse outcomes    Chronic Medical Problems:  Continue home regimen as indicated  Puffers will NOT be subbed to NEBS, rather the opposite if indicated as per COVID protocols   allopurinol  300 mg Oral Daily    amLODIPine  10 mg Oral Daily    colchicine  0.6 mg Oral Daily    [START ON 1/14/2023] dulaglutide  1.5 mg SubCUTAneous Weekly    glipiZIDE-metFORMIN  1 tablet Oral BID    valsartan  40 mg Oral Daily    metoprolol  200 mg Oral BID    cloNIDine  0.2 mg Oral TID    atorvastatin  20 mg Oral Daily     Supportive and Prophylactic Txx:  DVT PPx: as per above   GI (PUD) PPx: as per above   PT: as per age and admitted status will be referred to PT  ADULT DIET; Regular; 4 carb choices (60 gm/meal); Low Fat/Low Chol/High Fiber/2 gm Na; Low Sodium (2 gm)  sodium chloride flush, sodium chloride, ondansetron **OR** ondansetron, acetaminophen **OR** acetaminophen, guaiFENesin-dextromethorphan, polyethylene glycol, melatonin, calcium carbonate      Care time of >45 minutes  Pt seen/examined and admitted to inpatient status. Inpatient status is used for patients with an expected LOS extending past two midnights due to medical therapy and or critical care needs, otherwise patients are placed to OBServation status. Signed:  Electronically signed by German Hawkins MD on 1/8/23 at 07:19 AM CST.

## 2023-01-08 NOTE — ED PROVIDER NOTES
[facility] EMERGENCY DEPT  eMERGENCY dEPARTMENT eNCOUnter      Pt Name: Maximiliano Bolanos  MRN: [id number]  [place] 1955  Date of evaluation: 1/7/2023  Provider: Alberto Dailey MD    CHIEF COMPLAINT       Chief Complaint   Patient presents with    Shortness of Breath     Covid + 2 weeks ago, family reports shortness of breath and increased weakness         HISTORY OF PRESENT ILLNESS   (Location/Symptom, Timing/Onset,Context/Setting, Quality, Duration, Modifying Factors, Severity)  Note limiting factors. Maximiliano Bolanos is a 74 y.o. male who presents to the emergency department for evaluation regarding increased cough and shortness of breath. Patient states he has not felt well for the past 3 to 4 days. Notes that this evening he had increased feelings of shortness of breath and decreased oral intake. Patient's wife was present with him here in the emergency department tells me that she tested positive for COVID-19 infection about 1 week previously. Patient states that he is previously unvaccinated for COVID-19 and is never previously had COVID-19 infection. Patient denies any chest pain or palpitations. He has not had any acute syncopal events. HPI    NursingNotes were reviewed. REVIEW OF SYSTEMS    (2-9 systems for level 4, 10 or more for level 5)     Review of Systems   Constitutional:  Positive for appetite change, fatigue and fever. HENT:  Positive for congestion. Respiratory:  Positive for cough and shortness of breath. Cardiovascular:  Negative for chest pain and palpitations. Neurological:  Negative for dizziness and syncope. All other systems reviewed and are negative.          PAST MEDICALHISTORY     Past Medical History:   Diagnosis Date    Acute hyperkalemia     secondary to aldactone and zestril    Chronic kidney disease (CKD), stage III (moderate) ([address] 6/14/2011    Diabetes mellitus ([facility]     NIDDM    Diastolic dysfunction 4/97/0076    History of CVA (cerebrovascular accident) 6/14/2011    Hyperkalemia 6/14/2011    Hyperlipemia     cholesterol management, Royer LOAIZA    Hypertension     Kidney disease     sees Dr. Dylan Solano    Left ventricular hypertrophy 6/14/2011    Lung nodule     calcified nodule RML    Vertebral artery stenosis 6/14/2011    Vitamin D deficiency          SURGICAL HISTORY     History reviewed. No pertinent surgical history. CURRENT MEDICATIONS     Previous Medications    ALLOPURINOL (ZYLOPRIM) 300 MG TABLET    Take 300 mg by mouth daily    AMLODIPINE (NORVASC) 10 MG TABLET    Take 10 mg by mouth daily. ATORVASTATIN (LIPITOR) 20 MG TABLET    Take 1 tablet by mouth daily    AZITHROMYCIN (ZITHROMAX) 250 MG TABLET    Take 250 mg by mouth daily 2 tablets on first day, then 1 daily for 4 days    CLONIDINE (CATAPRES) 0.2 MG TABLET    Take 0.2 mg by mouth three times daily. COLCHICINE PO    Take 0.6 mg by mouth    DULAGLUTIDE (TRULICITY) 1.5 DS/1.5RQ SC INJECTION    Inject 1.5 mg into the skin once a week    FERROUS GLUCONATE (FERGON) 225 (27 FE) MG TABLET    Take by mouth daily    GLIPIZIDE-METFORMIN (METAGLIP) 5-500 MG PER TABLET    Take 1 tablet by mouth 2 times daily    INDOMETHACIN (INDOCIN) 50 MG CAPSULE      Take 50 mg by mouth 3 times daily as needed     LOSARTAN (COZAAR) 25 MG TABLET    Take 1 tablet by mouth daily. LOSARTAN-HYDROCHLOROTHIAZIDE (HYZAAR) 100-25 MG PER TABLET    Take 1 tablet by mouth daily. METHYLPREDNISOLONE (MEDROL DOSEPACK) 4 MG TABLET    Take 4 mg by mouth See Admin Instructions 6 tablets day 1, decrease by 1 tab daily for total of 6 days    METOPROLOL (LOPRESSOR) 100 MG TABLET    Take 100 mg by mouth Take 2 tablets twice a day    PREDNISONE (DELTASONE) 5 MG TABLET    Take 5 mg by mouth daily Daily as needed    VALSARTAN (DIOVAN) 40 MG TABLET    Take 40 mg by mouth daily       ALLERGIES     Lisinopril    FAMILY HISTORY     History reviewed. No pertinent family history.        SOCIAL HISTORY       Social History     Socioeconomic History    Marital status:      Spouse name: None    Number of children: None    Years of education: None    Highest education level: None   Tobacco Use    Smoking status: Former     Packs/day: 1.00     Years: 21.00     Pack years: 21.00     Types: Cigarettes     Start date: 1973     Quit date: 1994     Years since quittin.4    Smokeless tobacco: Former   Vaping Use    Vaping Use: Never used   Substance and Sexual Activity    Alcohol use: No    Drug use: No       SCREENINGS    [place] Coma Scale  Eye Opening: Spontaneous  Best Verbal Response: Oriented  Best Motor Response: Obeys commands  [place] Coma Scale Score: 15        PHYSICAL EXAM    (up to 7 for level 4, 8 or more for level 5)     ED Triage Vitals [23 2326]   BP Temp Temp Source Heart Rate Resp SpO2 Height Weight   (!) 146/69 98.3 °F (36.8 °C) Oral 71 16 93 % 5' 8\" (1.727 m) 220 lb (99.8 kg)       Physical Exam  Vitals and nursing note reviewed. HENT:      Head: Atraumatic. Mouth/Throat:      Mouth: Mucous membranes are moist. Mucous membranes are not dry. Pharynx: No posterior oropharyngeal erythema. Eyes:      General: No scleral icterus. Pupils: Pupils are equal, round, and reactive to light. Neck:      Trachea: No tracheal deviation. Cardiovascular:      Rate and Rhythm: Normal rate and regular rhythm. Pulses: Normal pulses. Heart sounds: Normal heart sounds. No murmur heard. Pulmonary:      Effort: Pulmonary effort is normal. No respiratory distress. Breath sounds: No stridor. Rhonchi present. Abdominal:      General: There is no distension. Palpations: Abdomen is soft. Tenderness: There is no abdominal tenderness. There is no guarding. Musculoskeletal:      Right lower leg: No edema. Left lower leg: No edema. Skin:     Capillary Refill: Capillary refill takes less than 2 seconds. Coloration: Skin is not pale. Findings: No rash.    Neurological: General: No focal deficit present. Mental Status: He is alert and oriented to person, place, and time. Psychiatric:         Behavior: Behavior is cooperative. DIAGNOSTIC RESULTS     EKG: All EKG's areinterpreted by the Emergency Department Physician who either signs or Co-signs this chart in the absence of a cardiologist.    2350: Normal sinus rhythm at a rate of 72, no evidence of acute ST elevation is identified. QTc: 495 MS. RADIOLOGY:  Non-plain film images such as CT, Ultrasound and MRI are read by the radiologist. Plain radiographic images are visualized and preliminarily interpreted bythe emergency physician with the below findings:      CT ABDOMEN PELVIS W IV CONTRAST Additional Contrast? None   Final Result   There is some thickening of the gallbladder wall. No calcified stones. Further evaluation, consider correlation with ultrasound. There is a small amount of ascites present around the liver and in the pelvis. Electronically signed by    Anthony Ariza MD on 01-08-23 at [address]   Final Result   There is evidence for old granulomatous disease. There is elevation left hemidiaphragm. Electronically signed by Anthony Ariza MD on 01-08-23 at 0100              LABS:  Labs Reviewed   COVID-19, RAPID - Abnormal; Notable for the following components:       Result Value    SARS-CoV-2, NAAT DETECTED (*)     All other components within normal limits    Narrative:     Praneeth Hackett tel. ,  Microbiology results called to and read back by ashanti hutton rn/er,  01/08/2023 00:08, by WILLIS   CBC WITH AUTO DIFFERENTIAL - Abnormal; Notable for the following components:    WBC 11.7 (*)     RBC 4.63 (*)     MCV 95.0 (*)     MCH 32.4 (*)     RDW 17.2 (*)     Neutrophils % 83.9 (*)     Lymphocytes % 4.8 (*)     Neutrophils Absolute 9.8 (*)     Lymphocytes Absolute 0.6 (*)     Monocytes Absolute 1.20 (*)     All other components within normal limits   COMPREHENSIVE METABOLIC PANEL - Abnormal; Notable for the following components:    Glucose 61 (*)     BUN 35 (*)     Creatinine 1.3 (*)     Albumin 3.2 (*)     Total Bilirubin 2.8 (*)     Alkaline Phosphatase 186 (*)     ALT 76 (*)      (*)     All other components within normal limits   BLOOD GAS, ARTERIAL - Abnormal; Notable for the following components:    pH, Arterial 7.480 (*)     pO2, Arterial 64.0 (*)     HCO3, Arterial 29.0 (*)     Base Excess, Arterial 5.2 (*)     All other components within normal limits   POCT GLUCOSE - Abnormal; Notable for the following components:    POC Glucose 61 (*)     All other components within normal limits   POCT GLUCOSE - Abnormal; Notable for the following components:    POC Glucose 132 (*)     All other components within normal limits   BRAIN NATRIURETIC PEPTIDE       All other labs were within normal range or not returned as of this dictation. EMERGENCY DEPARTMENT COURSE and DIFFERENTIAL DIAGNOSIS/MDM:   Vitals:    Vitals:    01/07/23 2326 01/08/23 0230 01/08/23 0339   BP: (!) 146/69  (!) 144/72   Pulse: 71  72   Resp: 16 18 18   Temp: 98.3 °F (36.8 °C)     TempSrc: Oral     SpO2: 93% 91% 92%   Weight: 220 lb (99.8 kg)     Height: 5' 8\" (1.727 m)         MDM     Amount and/or Complexity of Data Reviewed  Clinical lab tests: ordered and reviewed  Tests in the radiology section of CPT®: ordered and reviewed  Discuss the patient with other providers: yes  Independent visualization of images, tracings, or specimens: yes      Patient presents to the ED with increased shortness of breath and cough for several days duration. Unvaccinated for COVID-19. Patient is noted to be positive for COVID-19 infection. His PO2 on ABG was 64. I have independently reviewed patient's EKG with interpretation as noted above. Review of his chemistry panel reveals elevated serum creatinine of 1.3/BUN 35 consistent with mild CHRISTY. His bilirubin is also elevated at 2.8, alkaline phosphatase 186.   CT scan demonstrates some thickening of the gallbladder wall and further evaluation with ultrasound is recommended. CONSULTS:    Case was discussed with Dr. Zonia Leon regarding inpatient admission.       PROCEDURES:  Unless otherwise noted below, none     Procedures    FINAL IMPRESSION      Covid 19 Infection  Elevated bilirubin  Acute Kidney Injury    DISPOSITION/PLAN   DISPOSITION  Admitted    (Please note that portions of this note were completed with a voice recognition program.  Efforts were made to edit thedictations but occasionally words are mis-transcribed.)    Barrera Melara MD (electronically signed)  Attending Emergency Physician          Barrera Melara MD  01/08/23 9891

## 2023-01-09 ENCOUNTER — APPOINTMENT (OUTPATIENT)
Dept: ULTRASOUND IMAGING | Age: 68
DRG: 682 | End: 2023-01-09
Payer: MEDICARE

## 2023-01-09 PROBLEM — E11.9 TYPE 2 DIABETES MELLITUS (HCC): Status: ACTIVE | Noted: 2020-12-02

## 2023-01-09 PROBLEM — U07.1 COVID-19: Status: ACTIVE | Noted: 2023-01-09

## 2023-01-09 LAB
ALBUMIN SERPL-MCNC: 2.7 G/DL (ref 3.5–5.2)
ALP BLD-CCNC: 142 U/L (ref 40–130)
ALT SERPL-CCNC: 76 U/L (ref 5–41)
ANION GAP SERPL CALCULATED.3IONS-SCNC: 10 MMOL/L (ref 7–19)
AST SERPL-CCNC: 111 U/L (ref 5–40)
BASOPHILS ABSOLUTE: 0 K/UL (ref 0–0.2)
BASOPHILS RELATIVE PERCENT: 0.2 % (ref 0–1)
BILIRUB SERPL-MCNC: 2.2 MG/DL (ref 0.2–1.2)
BUN BLDV-MCNC: 32 MG/DL (ref 8–23)
C-REACTIVE PROTEIN: 2.04 MG/DL (ref 0–0.5)
CALCIUM SERPL-MCNC: 8.3 MG/DL (ref 8.8–10.2)
CHLORIDE BLD-SCNC: 103 MMOL/L (ref 98–111)
CO2: 27 MMOL/L (ref 22–29)
CREAT SERPL-MCNC: 1.3 MG/DL (ref 0.5–1.2)
D DIMER: 2.62 UG/ML FEU (ref 0–0.48)
EKG P AXIS: 54 DEGREES
EKG P-R INTERVAL: 176 MS
EKG Q-T INTERVAL: 328 MS
EKG QRS DURATION: 108 MS
EKG QTC CALCULATION (BAZETT): 349 MS
EKG T AXIS: 84 DEGREES
EOSINOPHILS ABSOLUTE: 0.1 K/UL (ref 0–0.6)
EOSINOPHILS RELATIVE PERCENT: 1.3 % (ref 0–5)
GFR SERPL CREATININE-BSD FRML MDRD: >60 ML/MIN/{1.73_M2}
GLUCOSE BLD-MCNC: 104 MG/DL (ref 70–99)
GLUCOSE BLD-MCNC: 109 MG/DL (ref 70–99)
GLUCOSE BLD-MCNC: 181 MG/DL (ref 70–99)
GLUCOSE BLD-MCNC: 60 MG/DL (ref 70–99)
GLUCOSE BLD-MCNC: 61 MG/DL (ref 74–109)
GLUCOSE BLD-MCNC: 69 MG/DL (ref 70–99)
HCT VFR BLD CALC: 36 % (ref 42–52)
HEMOGLOBIN: 12.3 G/DL (ref 14–18)
IMMATURE GRANULOCYTES #: 0.1 K/UL
LYMPHOCYTES ABSOLUTE: 1.1 K/UL (ref 1.1–4.5)
LYMPHOCYTES RELATIVE PERCENT: 12.5 % (ref 20–40)
MCH RBC QN AUTO: 32.1 PG (ref 27–31)
MCHC RBC AUTO-ENTMCNC: 34.2 G/DL (ref 33–37)
MCV RBC AUTO: 94 FL (ref 80–94)
MONOCYTES ABSOLUTE: 1 K/UL (ref 0–0.9)
MONOCYTES RELATIVE PERCENT: 11.4 % (ref 0–10)
NEUTROPHILS ABSOLUTE: 6.4 K/UL (ref 1.5–7.5)
NEUTROPHILS RELATIVE PERCENT: 73.2 % (ref 50–65)
PDW BLD-RTO: 16.9 % (ref 11.5–14.5)
PERFORMED ON: ABNORMAL
PLATELET # BLD: 106 K/UL (ref 130–400)
PMV BLD AUTO: 11.1 FL (ref 9.4–12.4)
POTASSIUM REFLEX MAGNESIUM: 3.6 MMOL/L (ref 3.5–5)
RBC # BLD: 3.83 M/UL (ref 4.7–6.1)
SODIUM BLD-SCNC: 140 MMOL/L (ref 136–145)
TOTAL PROTEIN: 5.3 G/DL (ref 6.6–8.7)
WBC # BLD: 8.7 K/UL (ref 4.8–10.8)

## 2023-01-09 PROCEDURE — 6360000002 HC RX W HCPCS: Performed by: HOSPITALIST

## 2023-01-09 PROCEDURE — 86140 C-REACTIVE PROTEIN: CPT

## 2023-01-09 PROCEDURE — 76705 ECHO EXAM OF ABDOMEN: CPT

## 2023-01-09 PROCEDURE — 82947 ASSAY GLUCOSE BLOOD QUANT: CPT

## 2023-01-09 PROCEDURE — 80053 COMPREHEN METABOLIC PANEL: CPT

## 2023-01-09 PROCEDURE — 97530 THERAPEUTIC ACTIVITIES: CPT

## 2023-01-09 PROCEDURE — 85379 FIBRIN DEGRADATION QUANT: CPT

## 2023-01-09 PROCEDURE — 2580000003 HC RX 258: Performed by: NURSE PRACTITIONER

## 2023-01-09 PROCEDURE — 6370000000 HC RX 637 (ALT 250 FOR IP): Performed by: HOSPITALIST

## 2023-01-09 PROCEDURE — 2580000003 HC RX 258: Performed by: STUDENT IN AN ORGANIZED HEALTH CARE EDUCATION/TRAINING PROGRAM

## 2023-01-09 PROCEDURE — 97165 OT EVAL LOW COMPLEX 30 MIN: CPT

## 2023-01-09 PROCEDURE — 85025 COMPLETE CBC W/AUTO DIFF WBC: CPT

## 2023-01-09 PROCEDURE — 36415 COLL VENOUS BLD VENIPUNCTURE: CPT

## 2023-01-09 PROCEDURE — 1210000000 HC MED SURG R&B

## 2023-01-09 PROCEDURE — 93010 ELECTROCARDIOGRAM REPORT: CPT | Performed by: INTERNAL MEDICINE

## 2023-01-09 RX ORDER — VITAMIN B COMPLEX
1000 TABLET ORAL DAILY
Status: DISCONTINUED | OUTPATIENT
Start: 2023-01-09 | End: 2023-01-10 | Stop reason: HOSPADM

## 2023-01-09 RX ORDER — SODIUM CHLORIDE, SODIUM LACTATE, POTASSIUM CHLORIDE, CALCIUM CHLORIDE 600; 310; 30; 20 MG/100ML; MG/100ML; MG/100ML; MG/100ML
INJECTION, SOLUTION INTRAVENOUS CONTINUOUS
Status: ACTIVE | OUTPATIENT
Start: 2023-01-09 | End: 2023-01-09

## 2023-01-09 RX ADMIN — DEXTROSE MONOHYDRATE 125 ML: 100 INJECTION, SOLUTION INTRAVENOUS at 06:32

## 2023-01-09 RX ADMIN — ENOXAPARIN SODIUM 30 MG: 100 INJECTION SUBCUTANEOUS at 21:21

## 2023-01-09 RX ADMIN — METOPROLOL TARTRATE 200 MG: 50 TABLET, FILM COATED ORAL at 21:20

## 2023-01-09 RX ADMIN — CLONIDINE HYDROCHLORIDE 0.2 MG: 0.1 TABLET ORAL at 21:21

## 2023-01-09 RX ADMIN — SODIUM CHLORIDE, SODIUM LACTATE, POTASSIUM CHLORIDE, AND CALCIUM CHLORIDE: 600; 310; 30; 20 INJECTION, SOLUTION INTRAVENOUS at 09:30

## 2023-01-09 ASSESSMENT — ENCOUNTER SYMPTOMS
NAUSEA: 0
COUGH: 0
ABDOMINAL PAIN: 0
ABDOMINAL DISTENTION: 0
SHORTNESS OF BREATH: 0
BLOOD IN STOOL: 0
WHEEZING: 0
COLOR CHANGE: 0
VOMITING: 0
DIARRHEA: 0
CONSTIPATION: 0

## 2023-01-09 NOTE — PROGRESS NOTES
Occupational Therapy  Facility/Department: [facility] 4 ONCOLOGY UNIT  Occupational Therapy Initial Assessment    Name: Radha Longoria  : 1955  MRN: [id number]  Date of Service: 2023    Discharge Recommendations:             Patient Diagnosis(es): The primary encounter diagnosis was COVID-19 virus infection. Diagnoses of Elevated bilirubin and CHRISTY (acute kidney injury) ([facility] were also pertinent to this visit. Past Medical History:  has a past medical history of AA (alcohol abuse), Chronic kidney disease (CKD), stage III (moderate) ([address] COVID-19, Diabetes mellitus ([facility] [facility] Diastolic CHF ([facility] [address] History of CVA (cerebrovascular accident), Hyperkalemia, Hyperlipemia, Hypertension, Left ventricular hypertrophy, Lung nodule, Tobacco abuse, in remission, Vertebral artery stenosis, and Vitamin D deficiency. Past Surgical History:  has no past surgical history on file. Treatment Diagnosis: Covid, Generalized weakness , shortness of breath      Assessment   Performance deficits / Impairments: Decreased ADL status; Decreased functional mobility ; Decreased endurance;Decreased strength;Decreased cognition;Decreased ROM; Decreased balance  Assessment: Will progress as pt. tolerates. Treatment Diagnosis: Covid, Generalized weakness , shortness of breath  Prognosis: Good  Decision Making: Low Complexity  REQUIRES OT FOLLOW-UP: Yes  Activity Tolerance  Activity Tolerance: Patient Tolerated treatment well        Plan   Occupational Therapy Plan  Times Per Week: 3-5x/week  Times Per Day: Once a day     Restrictions       Subjective   General  Chart Reviewed: Yes  Patient assessed for rehabilitation services?: Yes  Additional Pertinent Hx: CVA in  affecting RUE/RLE  Family / Caregiver Present: Yes  Diagnosis: Covid infection,  Shortness of Breath, Generalized Weakness  General Comment  Comments: Spouse reports pt. has been weaker for almost 2 weeks.   Was relatively independent before that     Social/Functional History  Social/Functional History  Lives With: Spouse  Type of Home: House  Home Access: Ramped entrance  ADL Assistance: Independent  Ambulation Assistance: Independent  Transfer Assistance: Independent       Objective   Heart Rate: 73  BP: (!) 140/68  MAP (Calculated): 92  Resp: 14  SpO2: 91 %  O2 Device: None (Room air)                      AROM: Generally decreased, functional (R shld limited to around 60 deg elevation)  Strength: Generally decreased, functional (R shld 3-/5, elbow 3+/5)  ADL  Feeding: Setup  Grooming: Minimal assistance  UE Bathing: Minimal assistance  LE Bathing: Moderate assistance  UE Dressing: Minimal assistance  LE Dressing: Maximum assistance  Toileting: Maximum assistance  Additional Comments: Decreased alertness on eval        Bed mobility  Supine to Sit: Minimal assistance  Bed Mobility Comments: SBA for bed mobility due to lethargy  Transfers  Stand Step Transfers: Minimal assistance; Moderate assistance  Sit to stand: Moderate assistance  Transfer Comments: Handheld assist to step 3 steps to [facility]. Will bring walker next session  Vision  Vision: Within Functional Limits  Hearing  Hearing: Within functional limits  Cognition  Overall Cognitive Status: Exceptions  Following Commands:  Follows one step commands with repetition  Attention Span: Attends with cues to redirect  Memory: Decreased short term memory  Orientation  Overall Orientation Status: Within Functional Limits                                           G-Code     OutComes Score                                                  AM-PAC Score             Tinneti Score       Goals  Short Term Goals  Time Frame for Short Term Goals: 1 week  Short Term Goal 1: Osei with toilet tfers with RW  Short Term Goal 2: Osei with clothing mgmt in standing  Short Term Goal 3: Supervision with seated LB dsg tasks  Short Term Goal 4: Tolerate standing 30 seconds to assist with self care  Long Term Goals  Long Term Goal 1: Return to [facility] Therapy Time   Individual Concurrent Group Co-treatment   Time In           Time Out           Minutes                   Carlos Kaiser, OT

## 2023-01-09 NOTE — CARE COORDINATION
Case Management Assessment  Initial Evaluation    Date/Time of Evaluation: 1/9/2023 3:52 PM  Assessment Completed by: Rebeca Bonilla RN    If patient is discharged prior to next notation, then this note serves as note for discharge by case management. Patient Name: Juan Luis Ortega                   YOB: 1955  Diagnosis: Transaminitis [R74.01]  Elevated bilirubin [R17]  COVID-19 virus infection [U07.1]                   Date / Time: 1/7/2023 11:25 PM    Patient Admission Status: Inpatient   Readmission Risk (Low < 19, Mod (19-27), High > 27): Readmission Risk Score: 17.7    Current PCP: Bryan Cleary  PCP verified by CM? Yes    Chart Reviewed: Yes      History Provided by: Spouse, Patient  Patient Orientation: Alert and Oriented    Patient Cognition: Alert    Hospitalization in the last 30 days (Readmission):  No    If yes, Readmission Assessment in CM Navigator will be completed. Advance Directives:      Code Status: Full Code   Patient's Primary Decision Maker is:      Primary Decision Maker: Aysha Campbell - Spouse - [phone]    Secondary Decision Maker: Brody Diaz - Child - [phone]    Secondary Decision Maker: [facility] Child - [phone]    Discharge Planning:    Patient lives with: (P) Spouse/Significant Other Type of Home: (P) House  Primary Care Giver: Spouse  Patient Support Systems include: Spouse/Significant Other   Current Financial resources: Medicare  Current community resources:  (none at this time)  Current services prior to admission: (P) None            Current DME: (P) Walker, Wheelchair            Type of Home Care services:  (P) PT    ADLS  Prior functional level: Independent in ADLs/IADLs  Current functional level: Assistance with the following:    PT AM-PAC:   /24  OT AM-PAC:   /24    Family can provide assistance at DC:  Yes  Would you like Case Management to discuss the discharge plan with any other family members/significant others, and if so, who? Yes  Plans to Return to Present Housing: Yes  Other Identified Issues/Barriers to RETURNING to current housing: yes  Potential Assistance needed at discharge: (P) Home Care            Potential DME: no  Patient expects to discharge to: (P) [address] [place] [address] for transportation at discharge: (P) Family    Financial    Payor: MEDICARE / Plan: MEDICARE PART A AND B / Product Type: *No Product type* /     Does insurance require precert for SNF: No    Potential assistance Purchasing Medications:    Meds-to-Beds request:        [address] [facility] [address] [facility] [phone] - F [phone]  [address] [facility] [address]  Phone: [phone] Fax: [phone]    [address], [address] [facility] [address] [facility] 124 [phone]  [address] [address]  Phone: [phone] Fax: Chasidy Lange [facility], [address] [facility] [phone] [phone]  [address] [facility]  [address] [place] [address]  Phone: [phone] Fax: [phone]      Notes:    Factors facilitating achievement of predicted outcomes: Family support, Caregiver support, Cooperative, Pleasant, Has needed Durable Medical Equipment at home, and Home is wheelchair accessible    Barriers to discharge: Decreased endurance and Lower extremity weakness    Additional Case Management Notes: Pt hx of CVA in 2011, per spouse weakness in R knee. Prior to 2 weeks ago, patient was independent and driving. Wife developed COVID, pt s/s prior to spouse, but did not want to test. Per spouse, pt has become increasingly weaker. The Plan for Transition of Care is related to the following treatment goals of Transaminitis [R74.01]  Elevated bilirubin [R17]  COVID-19 virus infection [Q01.4]    IF APPLICABLE: The Patient and/or patient representative Misael Clarke and his family were provided with a choice of provider and agrees with the discharge plan.  Freedom of choice list with basic dialogue that supports the patient's individualized plan of care/goals and shares the quality data associated with the providers was provided to:     Patient Representative Name:       The Patient and/or Patient Representative Agree with the Discharge Plan? Kaleta Mohs, RN  Case Management Department  Ph: [phone] Fax: [phone]    CM met with pt, spouse, son and dtr in law at the beside. Spouse w/recent COVID, pt actually s/s prior to spouse but did not want to test. Pt is unvaccinated. Pt does not wear 02, Pt has walker and w/c.   01/09/23 5360   Service Assessment   Patient Orientation Alert and Oriented   Cognition Alert   History Provided By Spouse;Patient   Primary Caregiver Spouse   Support Systems Spouse/Significant Other   PCP Verified by CM Yes   Prior Functional Level Independent in ADLs/IADLs   Current Functional Level Assistance with the following:   Can patient return to prior living arrangement Yes   Ability to make needs known: Good   Family able to assist with home care needs: Yes   Would you like for me to discuss the discharge plan with any other family members/significant others, and if so, who? Yes   Financial Resources [employer]   (none at this time)   CM/YOLANDA Referral   (none at this time)   Social/Functional History   Lives With Spouse   Type of [address] [facility] entrance   [address] [facility]; [facility] 11 Help From Family   ADL Assistance Needs assistance   Active  Yes   Discharge Planning   Type of Residence House   Living Arrangements Spouse/Significant Other   Current Services Prior To Admission None   Current DME Prior to [employer]; Wheelchair   Potential Assistance Needed Home Care   DME Ordered?  No   Type of Home Care Services PT   Patient expects to be discharged to: Antonieta Mitchell 90 Discharge   Transition of Care Consult (CM Consult) Home Health   Confirm Follow [address] [facility]  Patient Contact Information:    [address] [phone] (home)   Telephone Information:   Mobile [phone]     Above information verified? [x]   Yes  []   No      Emergency Contacts:    Extended Emergency Contact Information  Primary Emergency Contact: Aysha Campbell  Address: [address] [facility] [place], [address] [facility] [address] Phone: [phone]  Relation: Spouse  Secondary Emergency Contact: Pascale Jacobs   [facility] [address] Phone: [phone]  Relation: Child      Have you been vaccinated for COVID-19 (SARS-CoV-2)? []   Yes  [x]   No                   If so, when? Which :         []   Pfizer-BioNTech  []   Moderna  []   [address]  []   Other:         Pharmacy:    [address] [facility], [address] [facility] [address] [facility] [phone] -  [phone]  [address] [facility]  Phone: [phone] Fax: [phone]    [facility], [address] [place], [place] [address]vd [address] [phone]  [address] [address]  Phone: [phone] Fax: Chasidy Hernandez [facility], [address] [facility] [phone] [address] [phone]  [facility] [facility] [address]  [address] [place] [address]  Phone: [phone] Fax: [phone]          Patient Deficits:    [x]   Yes   []   No    If yes:    []   Confusion/Memory  []   Visual  []   Motor/Sensory         []   Right arm         [x]   Right leg         []   Left arm         []   Left leg  []   Language/Speech         []   Aphasia         []   Dysarthria         []   Swallow         Jeremiah Coma Scale  Eye Opening: Spontaneous  Best Verbal Response: Oriented  Best Motor Response: Obeys commands  Jeremiah Coma Scale Score: 15    Electronically signed by Apollo Rayo RN on 1/9/2023 at 3:57 PM

## 2023-01-09 NOTE — ACP (ADVANCE CARE PLANNING)
Advance Care Planning     Healthcare Decision Maker:      Primary Decision Maker: Lu  [facility] [phone]    Secondary Decision Maker: Heather Yan - Child - [phone]    Secondary Decision Maker: Ping Davenport - Child - [phone]        Today we documented Decision Maker(s) consistent with Legal Next of Kin hierarchy.

## 2023-01-09 NOTE — PROGRESS NOTES
[facility]      Patient:  Maximiliano Bolanos  YOB: 1955  Date of Service: 1/9/2023  MRN: [id number]   Acct: [de-identified]   Primary Care Physician: Lynn Dewitt  Advance Directive: Full Code  Admit Date: 1/7/2023       Hospital Day: 1  Portions of this note have been copied forward, however, changed to reflect the most current clinical status of this patient. CHIEF COMPLAINT Shortness of breath    SUBJECTIVE:  He is currently resting comfortably in bed and is without complaints. Remains on RA. No other events or issues overnight    CUMULATIVE HOSPITAL STAY:  The patient is a 75 yo male with a PMH of DM, CVA, HLD, and HTN who presented to [address] [place] [address] ED on 1/7/2022 complaining of cough and SOA. He reported he had not felt well in approximately 13 days. He is previously unvaccinated and denied having a pulse oximeter at home. His wife was at bedside reporting on her to perform a home COVID test.  Family did report increased weakness and decreased appetite. In ED he was found to be positive for COVID-19. PO2 on ABG was 64. Mild CHRISTY with a BUN of 35 and creatinine 1.3 and transaminitis. CT of the abdomen pelvis showed thickening of the gallbladder wall. He is admitted to hospital medicine for CHRISTY, COVID-19, and transaminitis. He is remained on room air. Renal functions remain stable-BUN 32 and creatinine 1.3. CRP 2.04. Alk phos 142, ALT 76,  biliirubin-2.2    Review of Systems:   Review of Systems   Constitutional:  Negative for chills, diaphoresis, fatigue and fever. HENT:  Negative for congestion and ear pain. Eyes:  Negative for visual disturbance. Respiratory:  Negative for cough, shortness of breath and wheezing. Cardiovascular:  Negative for chest pain, palpitations and leg swelling. Gastrointestinal:  Negative for abdominal distention, abdominal pain, blood in stool, constipation, diarrhea, nausea and vomiting.    Endocrine: Negative for cold intolerance and heat intolerance. Genitourinary:  Negative for difficulty urinating, flank pain, frequency and urgency. Musculoskeletal:  Negative for arthralgias and myalgias. Skin:  Negative for color change and wound. Neurological:  Negative for dizziness, syncope, weakness, light-headedness, numbness and headaches. Hematological:  Does not bruise/bleed easily. Psychiatric/Behavioral:  Negative for agitation, confusion and dysphoric mood. 14 point review of systems is negative except as specifically addressed above. Objective:   VITALS:  BP (!) 140/68   Pulse 73   Temp 98.6 °F (37 °C)   Resp 14   Ht 5' 8\" (1.727 m)   Wt 194 lb (88 kg)   SpO2 91%   BMI 29.50 kg/m²   24HR INTAKE/OUTPUT:    Intake/Output Summary (Last 24 hours) at 1/9/2023 1553  Last data filed at 1/9/2023 1143  Gross per 24 hour   Intake 0 ml   Output 600 ml   Net -600 ml       Physical Exam  Vitals and nursing note reviewed. Constitutional:       General: He is not in acute distress. Appearance: Normal appearance. He is not ill-appearing. HENT:      Head: Normocephalic and atraumatic. Right Ear: External ear normal.      Left Ear: External ear normal.      Nose: Nose normal.      Mouth/Throat:      Mouth: Mucous membranes are dry. Eyes:      Extraocular Movements: Extraocular movements intact. Conjunctiva/sclera: Conjunctivae normal.      Pupils: Pupils are equal, round, and reactive to light. Cardiovascular:      Rate and Rhythm: Normal rate and regular rhythm. Pulses: Normal pulses. Heart sounds: Normal heart sounds. Pulmonary:      Effort: Pulmonary effort is normal. No respiratory distress. Breath sounds: Rhonchi present. No wheezing or rales. Abdominal:      General: Bowel sounds are normal. There is no distension. Palpations: Abdomen is soft. Tenderness: There is no abdominal tenderness. Musculoskeletal:         General: No swelling, tenderness or deformity. Normal range of motion. Cervical back: Normal range of motion and neck supple. No muscular tenderness. Right lower leg: No edema. Left lower leg: No edema. Skin:     General: Skin is warm and dry. Coloration: Skin is pale. Findings: No bruising or lesion. Neurological:      Mental Status: He is alert and oriented to person, place, and time. Motor: Weakness present. Psychiatric:         Judgment: Judgment normal.           Medications:      lactated ringers      sodium chloride      dextrose        Vitamin D  1,000 Units Oral Daily    allopurinol  300 mg Oral Daily    amLODIPine  10 mg Oral Daily    [START ON 1/14/2023] dulaglutide  1.5 mg SubCUTAneous Weekly    valsartan  40 mg Oral Daily    metoprolol  200 mg Oral BID    cloNIDine  0.2 mg Oral TID    sodium chloride flush  5-40 mL IntraVENous 2 times per day    enoxaparin  30 mg SubCUTAneous BID     sodium chloride flush, sodium chloride, ondansetron **OR** ondansetron, acetaminophen **OR** acetaminophen, guaiFENesin-dextromethorphan, polyethylene glycol, melatonin, calcium carbonate, glucose, dextrose bolus **OR** dextrose bolus, glucagon (rDNA), dextrose  ADULT DIET; Regular; 4 carb choices (60 gm/meal)     Lab and other Data:     Recent Labs     01/07/23 2350 01/09/23  0202   WBC 11.7* 8.7   HGB 15.0 12.3*    106*     Recent Labs     01/07/23 2350 01/08/23  0041 01/08/23  1044 01/09/23  0202     --  140 140   K 4.0 3.7 3.5 3.6     --  101 103   CO2 29  --  28 27   BUN 35*  --  32* 32*   CREATININE 1.3*  --  1.2 1.3*   GLUCOSE 61*  --  107 61*     Recent Labs     01/07/23 2350 01/08/23  1044 01/09/23  0202   * 96* 111*   ALT 76* 70* 76*   BILITOT 2.8* 2.4* 2.2*   ALKPHOS 186* 170* 142*     Troponin T: No results for input(s): TROPONINI in the last 72 hours. Pro-BNP: No results for input(s): BNP in the last 72 hours. INR: No results for input(s): INR in the last 72 hours.   UA:  Recent Labs     01/08/23  [address] [facility] YELLOW   PHUR 6.5   CLARITYU Clear   SPECGRAV 1.026   LEUKOCYTESUR Negative   UROBILINOGEN 1.0   BILIRUBINUR Negative   BLOODU Negative   GLUCOSEU Negative     A1C:   Recent Labs     01/08/23  1044   LABA1C 5.6     ABG:  Recent Labs     01/08/23  0041   PHART 7.480*   WVW1FGE 39.0   PO2ART 64.0*   SWZ4BVF 29.0*   BEART 5.2*   HGBAE 14.2   Y0FOHDDU 91.3   CARBOXHGBART 1.5       RAD:   CT ABDOMEN PELVIS W IV CONTRAST Additional Contrast? None    Result Date: 1/8/2023  There is some thickening of the gallbladder wall. No calcified stones. Further evaluation, consider correlation with ultrasound. There is a small amount of ascites present around the liver and in the pelvis. Electronically signed by Myrl Babinski, MD on 01-08-23 at [facility]    Result Date: 1/8/2023  There is evidence for old granulomatous disease. There is elevation left hemidiaphragm. Electronically signed by Myrl Babinski, MD on 01-08-23 at 0100       Assessment/Plan   Principal Problem:    Transaminitis   -RUQ US completed   -Trend LFT's    -Avoid hepatotoxic agents   -?? R/T Covid infection or home treatment of Covid    Active Problems:    Type 2 diabetes mellitus ([facility] [address]   -Carb conscious diet   -Home Trulicity continued   -Accuchecks   -Hypoglycemic protocol      COVID-19   -Droplet + isolation   -VTE prophylaxis   -Vitals per unit routine   -I&O's per unit routine   -Labs in the AM   -Up as tolerated   -Continue to monitor for medical/supportive care       Chronic kidney disease (CKD), stage III (moderate) (HCC)              - Creatinine 1.3 today               - IVF-LR at 75 x 13 hours              - Monitor I's and O's closely              - Monitor labs closely              - Avoid hypotension              - Avoid nephrotoxic agents      Resolved Problems:    * No resolved hospital problems.  *       DVT Prophylaxis: Lovenox    Disposition: FADIA Estrada, 1/9/2023 3:53 PM

## 2023-01-10 ENCOUNTER — APPOINTMENT (OUTPATIENT)
Dept: CT IMAGING | Age: 68
DRG: 682 | End: 2023-01-10
Payer: MEDICARE

## 2023-01-10 VITALS
RESPIRATION RATE: 20 BRPM | DIASTOLIC BLOOD PRESSURE: 61 MMHG | HEART RATE: 79 BPM | BODY MASS INDEX: 29.4 KG/M2 | OXYGEN SATURATION: 90 % | SYSTOLIC BLOOD PRESSURE: 135 MMHG | WEIGHT: 194 LBS | HEIGHT: 68 IN | TEMPERATURE: 98.1 F

## 2023-01-10 LAB
ALBUMIN SERPL-MCNC: 2.5 G/DL (ref 3.5–5.2)
ALP BLD-CCNC: 135 U/L (ref 40–130)
ALT SERPL-CCNC: 73 U/L (ref 5–41)
ANION GAP SERPL CALCULATED.3IONS-SCNC: 11 MMOL/L (ref 7–19)
AST SERPL-CCNC: 110 U/L (ref 5–40)
BASOPHILS ABSOLUTE: 0 K/UL (ref 0–0.2)
BASOPHILS RELATIVE PERCENT: 0.2 % (ref 0–1)
BILIRUB SERPL-MCNC: 2.2 MG/DL (ref 0.2–1.2)
BUN BLDV-MCNC: 24 MG/DL (ref 8–23)
C-REACTIVE PROTEIN: 3.05 MG/DL (ref 0–0.5)
CALCIUM SERPL-MCNC: 7.8 MG/DL (ref 8.8–10.2)
CHLORIDE BLD-SCNC: 103 MMOL/L (ref 98–111)
CO2: 25 MMOL/L (ref 22–29)
CREAT SERPL-MCNC: 1.2 MG/DL (ref 0.5–1.2)
D DIMER: 2.31 UG/ML FEU (ref 0–0.48)
EOSINOPHILS ABSOLUTE: 0.2 K/UL (ref 0–0.6)
EOSINOPHILS RELATIVE PERCENT: 2.2 % (ref 0–5)
GFR SERPL CREATININE-BSD FRML MDRD: >60 ML/MIN/{1.73_M2}
GLUCOSE BLD-MCNC: 109 MG/DL (ref 70–99)
GLUCOSE BLD-MCNC: 110 MG/DL (ref 74–109)
GLUCOSE BLD-MCNC: 130 MG/DL (ref 70–99)
HCT VFR BLD CALC: 32.7 % (ref 42–52)
HEMOGLOBIN: 11.5 G/DL (ref 14–18)
IMMATURE GRANULOCYTES #: 0.1 K/UL
LYMPHOCYTES ABSOLUTE: 1.2 K/UL (ref 1.1–4.5)
LYMPHOCYTES RELATIVE PERCENT: 14 % (ref 20–40)
MCH RBC QN AUTO: 33.5 PG (ref 27–31)
MCHC RBC AUTO-ENTMCNC: 35.2 G/DL (ref 33–37)
MCV RBC AUTO: 95.3 FL (ref 80–94)
MONOCYTES ABSOLUTE: 1.1 K/UL (ref 0–0.9)
MONOCYTES RELATIVE PERCENT: 13.3 % (ref 0–10)
NEUTROPHILS ABSOLUTE: 5.9 K/UL (ref 1.5–7.5)
NEUTROPHILS RELATIVE PERCENT: 69.4 % (ref 50–65)
PDW BLD-RTO: 16.9 % (ref 11.5–14.5)
PERFORMED ON: ABNORMAL
PERFORMED ON: ABNORMAL
PLATELET # BLD: 95 K/UL (ref 130–400)
PMV BLD AUTO: 10.3 FL (ref 9.4–12.4)
POTASSIUM REFLEX MAGNESIUM: 3.6 MMOL/L (ref 3.5–5)
PROCALCITONIN: 0.32 NG/ML (ref 0–0.09)
RBC # BLD: 3.43 M/UL (ref 4.7–6.1)
SODIUM BLD-SCNC: 139 MMOL/L (ref 136–145)
TOTAL PROTEIN: 4.9 G/DL (ref 6.6–8.7)
WBC # BLD: 8.5 K/UL (ref 4.8–10.8)

## 2023-01-10 PROCEDURE — 36415 COLL VENOUS BLD VENIPUNCTURE: CPT

## 2023-01-10 PROCEDURE — 82947 ASSAY GLUCOSE BLOOD QUANT: CPT

## 2023-01-10 PROCEDURE — 71275 CT ANGIOGRAPHY CHEST: CPT

## 2023-01-10 PROCEDURE — 84145 PROCALCITONIN (PCT): CPT

## 2023-01-10 PROCEDURE — 97161 PT EVAL LOW COMPLEX 20 MIN: CPT

## 2023-01-10 PROCEDURE — 80053 COMPREHEN METABOLIC PANEL: CPT

## 2023-01-10 PROCEDURE — 85379 FIBRIN DEGRADATION QUANT: CPT

## 2023-01-10 PROCEDURE — 6360000004 HC RX CONTRAST MEDICATION: Performed by: NURSE PRACTITIONER

## 2023-01-10 PROCEDURE — 6370000000 HC RX 637 (ALT 250 FOR IP): Performed by: NURSE PRACTITIONER

## 2023-01-10 PROCEDURE — 86140 C-REACTIVE PROTEIN: CPT

## 2023-01-10 PROCEDURE — 97116 GAIT TRAINING THERAPY: CPT

## 2023-01-10 PROCEDURE — 85025 COMPLETE CBC W/AUTO DIFF WBC: CPT

## 2023-01-10 PROCEDURE — 94761 N-INVAS EAR/PLS OXIMETRY MLT: CPT

## 2023-01-10 PROCEDURE — 6370000000 HC RX 637 (ALT 250 FOR IP): Performed by: HOSPITALIST

## 2023-01-10 RX ORDER — CHOLECALCIFEROL (VITAMIN D3) 25 MCG
1000 TABLET ORAL DAILY
Qty: 60 TABLET | Refills: 0 | Status: SHIPPED | OUTPATIENT
Start: 2023-01-11

## 2023-01-10 RX ADMIN — AMLODIPINE BESYLATE 10 MG: 10 TABLET ORAL at 11:06

## 2023-01-10 RX ADMIN — METOPROLOL TARTRATE 200 MG: 50 TABLET, FILM COATED ORAL at 11:06

## 2023-01-10 RX ADMIN — IOPAMIDOL 70 ML: 755 INJECTION, SOLUTION INTRAVENOUS at 15:44

## 2023-01-10 RX ADMIN — CLONIDINE HYDROCHLORIDE 0.2 MG: 0.1 TABLET ORAL at 11:05

## 2023-01-10 RX ADMIN — VALSARTAN 40 MG: 40 TABLET ORAL at 11:05

## 2023-01-10 RX ADMIN — ALLOPURINOL 300 MG: 100 TABLET ORAL at 11:05

## 2023-01-10 RX ADMIN — Medication 1000 UNITS: at 11:05

## 2023-01-10 NOTE — CARE COORDINATION
Spoke with patient regarding MD orders for [facility] services. Pt agreeable and chose [facility]. Spoke with Chayo. Referral accepted and faxed. Please notify [facility] when patient discharges and Fax DC Summary,  DC med list and any new [facility] orders. P. [phone]  F. [phone]  The Patient and/or patient representative was provided with a choice of provider and agrees   with the discharge plan. [x] Yes [] No    Freedom of choice list was provided with basic dialogue that supports the patient's individualized plan of care/goals, treatment preferences and shares the quality data associated with the providers.  [x] Yes [] No  Electronically signed by Louann Negrete on 1/10/23 at 12:05 PM CST

## 2023-01-10 NOTE — CARE COORDINATION
CM re-visited with patient/spouse at the bedside. CM plan for discharge and recommendation for home care PT/OT. Spouse is asking if patient would qualify for new w/c and requesting at knee brace be addressed. CM recommended to spouse that it would be best for PT to address ambulation aide for patient once he has returned home. OT can address need for knee brace. Both of these items can be addressed between homecare and patient's PCP after discharge home.    Electronically signed by Olivia Irving RN on 1/10/2023 at 1:56 PM

## 2023-01-10 NOTE — DISCHARGE SUMMARY
Layton Keene  :  1955  MRN:  [id number]    Admit date:  2023  Discharge date:  1/10/2023    Discharging Physician:  Dr. Jay Pinedo Directive: Full Code    Consults: Galina Leon     Primary Care Physician: Ahmet Cheng    Discharge Diagnoses:  Principal Problem:    Transaminitis  Active Problems:    Type 2 diabetes mellitus ([address]    COVID-19    Chronic kidney disease (CKD), stage III (moderate) (HCC)  Resolved Problems:    * No resolved hospital problems. *      Portions of this note have been copied forward, however, changed to reflect the most current clinical status of this patient. Hospital Course: The patient is a 73 yo male with a PMH of DM, CVA, HLD, and HTN who presented to [facility] ED on 2022 complaining of cough and SOA. He reported he had not felt well in approximately 13 days. He is previously unvaccinated and denied having a pulse oximeter at home. Family reported he had increased weakness and decreased appetite therefore they brought him today [facility] ED for further evaluation. He was found to be COVID-positive in the ED with a PO2 on ABG of 64. He was additionally found to have a mild CHRISTY with BUN of 35 and creatinine of 1.3 with mild transaminitis. He was admitted to hospital medicine for CHRISTY, COVID-19 and transaminitis. He is remained on room air. His liver functions have remained stable throughout his hospitalization with today's alk phos 135, ALT 73,  and bilirubin 2.2. Renal functions have improved to baseline with a BUN of 24 and creatinine of 1.2. D-dimer was positive at 2.21 therefore CTA pulmonary was performed which was negative for acute PE. Home O2 evaluation was performed via RT and nursing staff which shows that he does not need home O2 with an SPO2 of 92% on room air with exertion. He reports that shortness of breath has improved.   Family reported his appetite is improving and he has returned to near his baseline. He is agreeable to home health to assist with PT/OT and in addition to skilled nursing and home health aide per recommendations of PT and OT therapy. [facility] home health is excepted the patient will be notified of his discharge. He is medically stable for discharge. He is to follow-up with his PCP within 1 week for hospital follow-up and follow-up labs. He will be discharged home on home health in stable condition. Significant Diagnostic Studies:   CT ABDOMEN PELVIS W IV CONTRAST Additional Contrast? None    Result Date: 1/8/2023  Patient: Haris Tobin  Time Out: 02:46Exam(s): CT ABDOMEN + PELVIS With Contrast EXAM:  CT Abdomen and Pelvis With Intravenous ContrastCLINICAL HISTORY:   Reason for exam: Abd pain, elevated bilirubin. TECHNIQUE:  Axial computed tomography images of the abdomen and pelvis with intravenous contrast.COMPARISON:  No relevant prior studies available. FINDINGS:  Lung bases: There are calcifications at the right lung base most compatible with old granulomatous disease. There is elevation of the left hemidiaphragm. . ABDOMEN:  Liver:  Unremarkable. No mass. Gallbladder and bile ducts: There is some thickening of the gallbladder wall. No calcified stones. No ductal dilation. Pancreas:  Unremarkable. No mass. No ductal dilation. Spleen: There are small calcifications within the spleen most compatible with old granulomatous disease. Adrenals:   . No mass. Kidneys and ureters:    No solid mass. No hydronephrosis. Stomach and bowel: The stomach is distended containing retained foodstuffs as  well as air. There is air and stool noted in the colon. PELVIS:  Appendix: Unremarkable CT scan appearance noted the appendix. Bladder:  Unremarkable. No calculi are seen. Reproductive: The bladder contains scratched the prostate gland contains calcifications. ABDOMEN and PELVIS:  Intraperitoneal space:    No free air.   There is a small amount of ascites present around the liver and in the pelvis. Bones/joints: There are degenerative changes in the spine and hips. .  Soft tissues:  Unremarkable. Vasculature: There are some atherosclerotic changes. No abdominal aortic aneurysm. Lymph nodes:    No enlarged lymph nodes. There is some thickening of the gallbladder wall. No calcified stones. Further evaluation, consider correlation with ultrasound. There is a small amount of ascites present around the liver and in the pelvis. Electronically signed by Sunni Bautista MD on 01-08-23 at [facility]    Result Date: 1/8/2023  Patient: Ajay Batch  Time Out: 01:00Exam(s): FILM CXR 1 VIEW EXAM:  XR Chest, 1 ViewCLINICAL HISTORY:   Reason for exam: shortness of breath. TECHNIQUE:  Frontal view of the chest.COMPARISON:  No relevant prior studies available. FINDINGS:  Lungs: There are calcified nodular densities at the right lung base. No consolidation. No pulmonary vascular congestion is seen. Pleural space: There is elevation of the left hemidiaphragm. Skylar Wilcox No pneumothorax. Heart: Heart appears top normal in size. .  Mediastinum: There is mild uncoiling of the thoracic aorta. Bones/joints: There is a scoliosis of the spine with degenerative changes. There is evidence for old granulomatous disease. There is elevation left hemidiaphragm. Electronically signed by Sunni Bautista MD on 01-08-23 at 0100      Pertinent Labs:   CBC:   Recent Labs     01/07/23  2350 01/09/23  0202 01/10/23  0125   WBC 11.7* 8.7 8.5   HGB 15.0 12.3* 11.5*    106* 95*     BMP:    Recent Labs     01/08/23  1044 01/09/23  0202 01/10/23  0125    140 139   K 3.5 3.6 3.6    103 103   CO2 28 27 25   BUN 32* 32* 24*   CREATININE 1.2 1.3* 1.2   GLUCOSE 107 61* 110*     INR: No results for input(s): INR in the last 72 hours.     Physical Exam:  Vital Signs: /61   Pulse 79   Temp 98.1 °F (36.7 °C) (Temporal)   Resp 20   Ht 5' 8\" (1.727 m)   Wt 194 lb (88 kg) SpO2 90%   BMI 29.50 kg/m²   Physical Exam  Vitals and nursing note reviewed. Constitutional:       General: He is not in acute distress. Appearance: Normal appearance. He is obese. He is ill-appearing. HENT:      Head: Normocephalic and atraumatic. Right Ear: External ear normal.      Left Ear: External ear normal.      Nose: Nose normal.      Mouth/Throat:      Mouth: Mucous membranes are moist.   Eyes:      Extraocular Movements: Extraocular movements intact. Conjunctiva/sclera: Conjunctivae normal.      Pupils: Pupils are equal, round, and reactive to light. Cardiovascular:      Rate and Rhythm: Normal rate and regular rhythm. Pulses: Normal pulses. Heart sounds: Normal heart sounds. Pulmonary:      Effort: Pulmonary effort is normal. No respiratory distress. Breath sounds: Examination of the right-lower field reveals decreased breath sounds. Examination of the left-lower field reveals decreased breath sounds. Decreased breath sounds present. No wheezing, rhonchi or rales. Abdominal:      General: Bowel sounds are normal. There is no distension. Palpations: Abdomen is soft. Tenderness: There is no abdominal tenderness. Musculoskeletal:         General: No swelling, tenderness or deformity. Normal range of motion. Cervical back: Normal range of motion and neck supple. No muscular tenderness. Right lower leg: No edema. Left lower leg: No edema. Skin:     General: Skin is warm and dry. Findings: No bruising or lesion. Neurological:      Mental Status: He is alert. Mental status is at baseline. Motor: Weakness (Mild) present. Psychiatric:         Mood and Affect: Affect is flat.        Discharge Medications:         Medication List        START taking these medications      Vitamin D3 25 MCG Tabs  Take 1 tablet by mouth daily  Start taking on: January 11, 2023            CONTINUE taking these medications      allopurinol 300 MG tablet  Commonly known as: ZYLOPRIM     amLODIPine 10 MG tablet  Commonly known as: NORVASC     atorvastatin 20 MG tablet  Commonly known as: Lipitor  Take 1 tablet by mouth daily     cloNIDine 0.2 MG tablet  Commonly known as: CATAPRES     glipiZIDE-metFORMIN 5-500 MG per tablet  Commonly known as: METAGLIP     metoprolol 100 MG tablet  Commonly known as: LOPRESSOR     Trulicity 1.4 YO/1.9MW SC injection  Generic drug: dulaglutide     valsartan 40 MG tablet  Commonly known as: DIOVAN            STOP taking these medications      azithromycin 250 MG tablet  Commonly known as: ZITHROMAX     COLCHICINE PO     ferrous gluconate 225 (27 Fe) MG tablet  Commonly known as: FERGON     indomethacin 50 MG capsule  Commonly known as: INDOCIN     losartan 25 MG tablet  Commonly known as: COZAAR     losartan-hydroCHLOROthiazide 100-25 MG per tablet  Commonly known as: HYZAAR     methylPREDNISolone 4 MG tablet  Commonly known as: MEDROL DOSEPACK     predniSONE 5 MG tablet  Commonly known as: Hui Crimes               Where to Get Your Medications        These medications were sent to [address] [place] [address],Building [facility] [phone]  [address] [place] [address]      Phone: [phone]   Vitamin D3 25 MCG Tabs         Discharge Instructions: Follow up with Aster Cantor  as scheduled within 1 week of hospital discharge for follow-up    Take medications as directed. Resume activity as tolerated. Diet: ADULT DIET; Regular; 4 carb choices (60 gm/meal); Safety Tray; Safety Tray (Disposables)     Disposition: Patient is Stableand will be discharged to Home with [address] [place] [address]. Time spent on discharge 38 minutes spent in assessing patient, reviewing medications, discussion with nursing, confirming safe discharge plan and preparation of discharge summary.     Signed:  FADIA Washington, 1/10/2023 4:27 PM

## 2023-01-10 NOTE — DISCHARGE INSTRUCTIONS
Follow up with Cheyenne Babin  as scheduled within 1 week of hospital discharge for follow-up   Take medications as directed. Resume activity as tolerated.

## 2023-01-10 NOTE — PROGRESS NOTES
Home O2 eval: Pt stated he has been unable to walk. While on RA at rest pt spo2 was 90%. While on RA with exertion (pt sat on side of bed) spo2 increased to 92%.

## 2023-01-10 NOTE — PROGRESS NOTES
Physical Therapy  Facility/Department: [facility] ONCOLOGY UNIT  Physical Therapy Initial Assessment    Name: Umang Leo  : 1955  MRN: [id number]  Date of Service: 1/10/2023    Discharge Recommendations:  Continue to assess pending progress, 24 hour supervision or assist, Home with Home health PT          Patient Diagnosis(es): The primary encounter diagnosis was COVID-19 virus infection. Diagnoses of Elevated bilirubin and CHRISTY (acute kidney injury) ([facility] were also pertinent to this visit. Past Medical History:  has a past medical history of AA (alcohol abuse), Chronic kidney disease (CKD), stage III (moderate) ([address] COVID-19, Diabetes mellitus ([facility] Diastolic CHF ([facility] History of CVA (cerebrovascular accident), Hyperkalemia, Hyperlipemia, Hypertension, Left ventricular hypertrophy, Lung nodule, Tobacco abuse, in remission, Vertebral artery stenosis, and Vitamin D deficiency. Past Surgical History:  has no past surgical history on file. Assessment   Body Structures, Functions, Activity Limitations Requiring Skilled Therapeutic Intervention: Decreased ADL status; Decreased functional mobility ; Decreased balance;Decreased strength;Decreased ROM  Assessment: Pt ABLE TO AMB SHORT DISTANCE IN ROOM WITH RW. Pt/SPOUSE DISCUSS HAVING A KNEE BRACE IN THE PAST TO ADDRESS HYPEREXTENSION/BUCKLING. CARE MGMT TO HAVE HOME HEALTH LOOK INTO THIS AGAIN.   Requires PT Follow-Up: Yes  Activity Tolerance  Activity Tolerance: Patient tolerated treatment well     Plan   Physcial Therapy Plan  General Plan: 3-5 times per week  Current Treatment Recommendations: Strengthening, ROM, Balance training, Functional mobility training, Transfer training, Gait training, Safety education & training, Patient/Caregiver education & training, Equipment evaluation, education, & procurement  Safety Devices  Type of Devices: Call light within reach (SPOUSE PRESENT)     Restrictions  Restrictions/Precautions  Restrictions/Precautions: Fall Risk, Isolation     Subjective   Pain: DENIES  General  Diagnosis: COVID, WEAKNESS, prev CVA R omar  Subjective  Subjective: Pt WILLING TO PARTICIPATE         Social/Functional History  Social/Functional History  Lives With: Spouse  Type of Home: House  Home Access: Ramped entrance  Bathroom Accessibility: Accessible  Home Equipment: karolina Charles, [address] Help From: Family  ADL Assistance: Independent  Ambulation Assistance: Independent  Transfer Assistance: Independent  Active : Yes  Additional Comments: HAD A KNEE BRACE (DESCRIPTION SOUNDS LIKE A RANGER BRACE-TYPE) IN PAST THAT HAS DETERIORATED. CARE MGMT TO HAVE HH LOOK INTO THAT AGAIN. Vision/Hearing  Vision  Vision: Within Functional Limits  Hearing  Hearing: Within functional limits    Cognition   Orientation  Overall Orientation Status: Within Functional Limits  Cognition  Overall Cognitive Status: WFL     Objective   Heart Rate: 79  Heart Rate Source: Monitor  BP: 135/61  BP Location: Left upper arm  MAP (Calculated): 86  Resp: 20  SpO2: 90 %  O2 Device: None (Room air)        Gross Assessment  AROM: Within functional limits (RLE DEC)  Strength:  Within functional limits (EXCEPT RLE GROSSLY +2/5)                    Bed mobility  Supine to Sit: Modified independent (EXTRA TIME)  Bed Mobility Comments: --  Transfers  Sit to Stand: Contact guard assistance;Minimal Assistance  Stand to Sit: Contact guard assistance  Bed to Chair: Contact guard assistance  Ambulation  Device: Rolling Walker  Assistance: Contact guard assistance  Quality of Gait: R KNEE HYPEREXTENDS FOR STANCE PHASE (WOULD BUCKLE IF KNEE WAS BENT FOR WEIGHTBEARING)  Gait Deviations: Slow Beverly;Decreased step length  Distance: 15'  Comments: VC'S TO REINFORCE SAFETY, TAKING HIS TIME     Balance  Sitting - Dynamic: Good  Standing - Dynamic: Fair           OutComes Score                                                  AM-PAC Score             Tinneti Score Goals  Short Term Goals  Time Frame for Short Term Goals: 14 DAYS  Short Term Goal 1: TRANSFERS MOD IND  Short Term Goal 2: AMB 48' RW SUPERVISION       Education  Patient Education  Education Given To: Patient; Family  Education Provided: Role of Therapy;Plan of Care;Equipment      Therapy Time   Individual Concurrent Group Co-treatment   Time In           Time Out           Minutes                   Jackelyn Hudson, PT

## 2023-01-19 NOTE — PROGRESS NOTES
Physician Progress Note      PATIENT:               Truong Rojas  CSN #:                  [id number]  :                       1955  ADMIT DATE:       2023 11:25 PM  [address] [place] DATE:        1/10/2023 5:45 PM  RESPONDING  PROVIDER #:        Genna Nash MD          QUERY TEXT:    Patient admitted with c/o increased weakness and sob, COVID + 2 weeks ago. Noted documentation of Acute Kidney Injury in H&P, & PN . In order to   support the diagnosis of CHRISTY, please include additional clinical indicators in   your documentation. ? Or please document if the diagnosis of CHRISTY has been   ruled out after further study. The medical record reflects the following:  Risk Factors: increassed weakness, CKD  Clinical Indicators: creat 1.3 down to 1.2   documentation of baseline is 1.3  Treatment: LR bolus 1L  Thanks, Wilson So BSN    Defined by Kidney Disease Improving Global Outcomes (KDIGO) clinical practice   guideline for acute kidney injury:  -Increase in SCr by greater than or equal to 0.3 mg/dl within 48 hours; or  -Increase or decrease in SCr to greater than or equal to 1.5 times baseline,   which is known or presumed to have occurred within the prior 7 days; or  -Urine volume < 0.5ml/kg/h for 6 hours. Options provided:  -- Acute kidney injury evidenced by, Please document evidence as well as a   numerical baseline creatinine, if known. -- Acute kidney injury ruled out after study  -- Other - I will add my own diagnosis  -- Disagree - Not applicable / Not valid  -- Disagree - Clinically unable to determine / Unknown  -- Refer to Clinical Documentation Reviewer    PROVIDER RESPONSE TEXT:    Acute kidney injury was ruled out after study.     Query created by: Kwabena Wynne on 2023 10:50 AM      Electronically signed by:  Genna Nash MD 2023 9:59 AM

## 2023-05-08 ENCOUNTER — OFFICE VISIT (OUTPATIENT)
Dept: GASTROENTEROLOGY | Age: 68
End: 2023-05-08
Payer: MEDICARE

## 2023-05-08 VITALS
OXYGEN SATURATION: 94 % | HEIGHT: 68 IN | SYSTOLIC BLOOD PRESSURE: 150 MMHG | WEIGHT: 195 LBS | DIASTOLIC BLOOD PRESSURE: 70 MMHG | BODY MASS INDEX: 29.55 KG/M2 | HEART RATE: 60 BPM

## 2023-05-08 DIAGNOSIS — K76.0 FATTY LIVER: ICD-10-CM

## 2023-05-08 DIAGNOSIS — R18.8 OTHER ASCITES: ICD-10-CM

## 2023-05-08 DIAGNOSIS — R74.8 ELEVATED LIVER ENZYMES: ICD-10-CM

## 2023-05-08 DIAGNOSIS — R74.8 ELEVATED LIVER ENZYMES: Primary | ICD-10-CM

## 2023-05-08 LAB
AFP SERPL-MCNC: 3 NG/ML (ref 0–8.3)
ALBUMIN SERPL-MCNC: 2.6 G/DL (ref 3.5–5.2)
ALP SERPL-CCNC: 179 U/L (ref 40–130)
ALT SERPL-CCNC: 21 U/L (ref 5–41)
AMMONIA PLAS-SCNC: 33 UMOL/L (ref 16–60)
ANION GAP SERPL CALCULATED.3IONS-SCNC: 11 MMOL/L (ref 7–19)
AST SERPL-CCNC: 51 U/L (ref 5–40)
BILIRUB DIRECT SERPL-MCNC: 0.9 MG/DL (ref 0–0.3)
BILIRUB INDIRECT SERPL-MCNC: 1.8 MG/DL (ref 0.1–1)
BILIRUB SERPL-MCNC: 2.7 MG/DL (ref 0.2–1.2)
BUN SERPL-MCNC: 14 MG/DL (ref 8–23)
CALCIUM SERPL-MCNC: 9.2 MG/DL (ref 8.8–10.2)
CHLORIDE SERPL-SCNC: 96 MMOL/L (ref 98–111)
CO2 SERPL-SCNC: 32 MMOL/L (ref 22–29)
CREAT SERPL-MCNC: 1.4 MG/DL (ref 0.5–1.2)
ERYTHROCYTE [DISTWIDTH] IN BLOOD BY AUTOMATED COUNT: 16.8 % (ref 11.5–14.5)
GAMMA GLUTAMYL TRANSFERASE: 24 U/L (ref 7–54)
GLUCOSE SERPL-MCNC: 105 MG/DL (ref 74–109)
HCT VFR BLD AUTO: 36.4 % (ref 42–52)
HGB BLD-MCNC: 12 G/DL (ref 14–18)
INR PPP: 1.31 (ref 0.88–1.18)
MCH RBC QN AUTO: 31.7 PG (ref 27–31)
MCHC RBC AUTO-ENTMCNC: 33 G/DL (ref 33–37)
MCV RBC AUTO: 96.3 FL (ref 80–94)
PLATELET # BLD AUTO: 180 K/UL (ref 130–400)
PMV BLD AUTO: 10.4 FL (ref 9.4–12.4)
POTASSIUM SERPL-SCNC: 3.5 MMOL/L (ref 3.5–5)
PROT SERPL-MCNC: 7 G/DL (ref 6.6–8.7)
PROTHROMBIN TIME: 15.8 SEC (ref 12–14.6)
RBC # BLD AUTO: 3.78 M/UL (ref 4.7–6.1)
SODIUM SERPL-SCNC: 139 MMOL/L (ref 136–145)
WBC # BLD AUTO: 10.9 K/UL (ref 4.8–10.8)

## 2023-05-08 PROCEDURE — G8419 CALC BMI OUT NRM PARAM NOF/U: HCPCS | Performed by: NURSE PRACTITIONER

## 2023-05-08 PROCEDURE — 1123F ACP DISCUSS/DSCN MKR DOCD: CPT | Performed by: NURSE PRACTITIONER

## 2023-05-08 PROCEDURE — G8427 DOCREV CUR MEDS BY ELIG CLIN: HCPCS | Performed by: NURSE PRACTITIONER

## 2023-05-08 PROCEDURE — 3078F DIAST BP <80 MM HG: CPT | Performed by: NURSE PRACTITIONER

## 2023-05-08 PROCEDURE — 3017F COLORECTAL CA SCREEN DOC REV: CPT | Performed by: NURSE PRACTITIONER

## 2023-05-08 PROCEDURE — 1036F TOBACCO NON-USER: CPT | Performed by: NURSE PRACTITIONER

## 2023-05-08 PROCEDURE — 3074F SYST BP LT 130 MM HG: CPT | Performed by: NURSE PRACTITIONER

## 2023-05-08 PROCEDURE — 99204 OFFICE O/P NEW MOD 45 MIN: CPT | Performed by: NURSE PRACTITIONER

## 2023-05-08 RX ORDER — SPIRONOLACTONE 100 MG/1
100 TABLET, FILM COATED ORAL DAILY
COMMUNITY

## 2023-05-08 RX ORDER — FUROSEMIDE 40 MG/1
40 TABLET ORAL DAILY
COMMUNITY

## 2023-05-08 ASSESSMENT — ENCOUNTER SYMPTOMS
ABDOMINAL DISTENTION: 0
COUGH: 0
DIARRHEA: 0
CHOKING: 0
VOMITING: 0
ANAL BLEEDING: 0
ABDOMINAL PAIN: 0
CONSTIPATION: 0
RECTAL PAIN: 0
SHORTNESS OF BREATH: 0
TROUBLE SWALLOWING: 0
NAUSEA: 0
BLOOD IN STOOL: 0

## 2023-05-08 NOTE — PROGRESS NOTES
a step to the front porch and a ramp on the patio       Current Outpatient Medications   Medication Sig Dispense Refill    furosemide (LASIX) 40 MG tablet Take 1 tablet by mouth daily For 30 Days      spironolactone (ALDACTONE) 100 MG tablet Take 1 tablet by mouth daily      Cholecalciferol (VITAMIN D) 25 MCG TABS Take 1 tablet by mouth daily 60 tablet 0    allopurinol (ZYLOPRIM) 300 MG tablet Take 1 tablet by mouth every other day      atorvastatin (LIPITOR) 20 MG tablet Take 1 tablet by mouth daily 30 tablet 2    metoprolol (LOPRESSOR) 100 MG tablet Take 1 tablet by mouth 2 times daily       No current facility-administered medications for this visit. Allergies   Allergen Reactions    Lisinopril Cough       Review of Systems   Constitutional:  Negative for activity change, appetite change, fatigue, fever and unexpected weight change. HENT:  Negative for trouble swallowing. Respiratory:  Negative for cough, choking and shortness of breath. Cardiovascular:  Negative for chest pain. Gastrointestinal:  Negative for abdominal distention, abdominal pain, anal bleeding, blood in stool, constipation, diarrhea, nausea, rectal pain and vomiting. Allergic/Immunologic: Negative for food allergies. All other systems reviewed and are negative. Objective:     BP (!) 150/70 (Site: Left Upper Arm)   Pulse 60   Ht 5' 8\" (1.727 m)   Wt 195 lb (88.5 kg)   SpO2 94%   BMI 29.65 kg/m²     Physical Exam  Vitals reviewed. Constitutional:       General: He is not in acute distress. Appearance: He is well-developed. HENT:      Head: Normocephalic and atraumatic. Right Ear: External ear normal.      Left Ear: External ear normal.      Nose: Nose normal.   Eyes:      General: No scleral icterus. Right eye: No discharge. Left eye: No discharge. Conjunctiva/sclera: Conjunctivae normal.      Pupils: Pupils are equal, round, and reactive to light.    Cardiovascular:      Rate and

## 2023-05-09 ENCOUNTER — TELEPHONE (OUTPATIENT)
Dept: GASTROENTEROLOGY | Age: 68
End: 2023-05-09

## 2023-05-09 NOTE — TELEPHONE ENCOUNTER
Pts wife called back and said they missed a phone call from us. I said it may have been Jessica, but Im not sure? Did you call patient? If so, please give them a call back. His wife will have his phone with her to answer.

## 2023-05-09 NOTE — TELEPHONE ENCOUNTER
----- Message from FADIA Garrett sent at 5/9/2023  2:25 PM CDT -----  I put in a referral to [facility] for Teresita Whitney and I just wanted to make sure it was seen.  MRN [id number]  Thank you,   Toney Nichols

## 2023-05-09 NOTE — TELEPHONE ENCOUNTER
5-9-23  Ref records have been faxed to [facility]  [phone]. Once received and reviewed, they will call [place] [facility] to scheduled. Fax confirmation scanned in media and attached to this encounter.

## 2023-05-10 ENCOUNTER — TELEPHONE (OUTPATIENT)
Dept: GASTROENTEROLOGY | Age: 68
End: 2023-05-10

## 2023-05-10 NOTE — TELEPHONE ENCOUNTER
----- Message from FADIA Kennedy sent at 5/9/2023  3:21 PM CDT -----  Liver enzymes remain elevated, creatinine elevated, GFR decreased, CBC abnormal   Please forward to PCP

## 2023-05-12 ENCOUNTER — TELEPHONE (OUTPATIENT)
Dept: GASTROENTEROLOGY | Age: 68
End: 2023-05-12

## 2023-05-12 NOTE — TELEPHONE ENCOUNTER
Called patient to remind them of their [place] Nain [place] [facility]  [place] to arrive at [address] [place] [address]

## 2023-05-15 ENCOUNTER — ANESTHESIA EVENT (OUTPATIENT)
Dept: ENDOSCOPY | Age: 68
End: 2023-05-15
Payer: MEDICARE

## 2023-05-15 NOTE — ANESTHESIA PRE PROCEDURE
(If Applicable):   Lab Results   Component Value Date/Time    COVID19 DETECTED 01/07/2023 11:30 PM           Anesthesia Evaluation  Patient summary reviewed no history of anesthetic complications:   Airway: Mallampati: II  TM distance: >3 FB   Neck ROM: full  Mouth opening: < 3 FB   Dental: normal exam         Pulmonary:normal exam                              ROS comment: Former tobacco user   Cardiovascular:  Exercise tolerance: no interval change,   (+) hypertension:, CHF:, hyperlipidemia         Beta Blocker:  Dose within 24 Hrs         Neuro/Psych:   (+) CVA (2011- generalized weakness):,             GI/Hepatic/Renal:   (+) liver disease (cirrhosis. elevated LFTs):, renal disease: CRI,           Endo/Other:    (+) DiabetesType II DM, , .                 Abdominal:             Vascular:   + PVD, aortic or cerebral, . Other Findings:           Anesthesia Plan      general and TIVA     ASA 3       Induction: intravenous. Anesthetic plan and risks discussed with patient.                         FADIA Nunez - CRNA   5/15/2023

## 2023-05-16 ENCOUNTER — HOSPITAL ENCOUNTER (OUTPATIENT)
Age: 68
Setting detail: OUTPATIENT SURGERY
Discharge: HOME | End: 2023-05-16
Attending: INTERNAL MEDICINE | Admitting: INTERNAL MEDICINE
Payer: MEDICARE

## 2023-05-16 ENCOUNTER — ANESTHESIA (OUTPATIENT)
Dept: ENDOSCOPY | Age: 68
End: 2023-05-16
Payer: MEDICARE

## 2023-05-16 VITALS
HEART RATE: 80 BPM | DIASTOLIC BLOOD PRESSURE: 72 MMHG | TEMPERATURE: 97.3 F | HEIGHT: 68 IN | BODY MASS INDEX: 28.49 KG/M2 | SYSTOLIC BLOOD PRESSURE: 139 MMHG | OXYGEN SATURATION: 98 % | RESPIRATION RATE: 16 BRPM | WEIGHT: 188 LBS

## 2023-05-16 PROCEDURE — 43235 EGD DIAGNOSTIC BRUSH WASH: CPT | Performed by: INTERNAL MEDICINE

## 2023-05-16 PROCEDURE — 7100000010 HC PHASE II RECOVERY - FIRST 15 MIN: Performed by: INTERNAL MEDICINE

## 2023-05-16 PROCEDURE — 2500000003 HC RX 250 WO HCPCS: Performed by: NURSE ANESTHETIST, CERTIFIED REGISTERED

## 2023-05-16 PROCEDURE — 2580000003 HC RX 258: Performed by: INTERNAL MEDICINE

## 2023-05-16 PROCEDURE — 7100000011 HC PHASE II RECOVERY - ADDTL 15 MIN: Performed by: INTERNAL MEDICINE

## 2023-05-16 PROCEDURE — 3700000001 HC ADD 15 MINUTES (ANESTHESIA): Performed by: INTERNAL MEDICINE

## 2023-05-16 PROCEDURE — 3700000000 HC ANESTHESIA ATTENDED CARE: Performed by: INTERNAL MEDICINE

## 2023-05-16 PROCEDURE — 3609012400 HC EGD TRANSORAL BIOPSY SINGLE/MULTIPLE: Performed by: INTERNAL MEDICINE

## 2023-05-16 PROCEDURE — 2709999900 HC NON-CHARGEABLE SUPPLY: Performed by: INTERNAL MEDICINE

## 2023-05-16 PROCEDURE — 6360000002 HC RX W HCPCS: Performed by: NURSE ANESTHETIST, CERTIFIED REGISTERED

## 2023-05-16 RX ORDER — COLCHICINE 0.6 MG/1
0.6 TABLET ORAL 2 TIMES DAILY
COMMUNITY

## 2023-05-16 RX ORDER — SODIUM CHLORIDE, SODIUM LACTATE, POTASSIUM CHLORIDE, CALCIUM CHLORIDE 600; 310; 30; 20 MG/100ML; MG/100ML; MG/100ML; MG/100ML
INJECTION, SOLUTION INTRAVENOUS CONTINUOUS
Status: DISCONTINUED | OUTPATIENT
Start: 2023-05-16 | End: 2023-05-16 | Stop reason: HOSPADM

## 2023-05-16 RX ORDER — PROPOFOL 10 MG/ML
INJECTION, EMULSION INTRAVENOUS PRN
Status: DISCONTINUED | OUTPATIENT
Start: 2023-05-16 | End: 2023-05-16 | Stop reason: SDUPTHER

## 2023-05-16 RX ORDER — LIDOCAINE HYDROCHLORIDE 10 MG/ML
INJECTION, SOLUTION INFILTRATION; PERINEURAL PRN
Status: DISCONTINUED | OUTPATIENT
Start: 2023-05-16 | End: 2023-05-16 | Stop reason: SDUPTHER

## 2023-05-16 RX ADMIN — LIDOCAINE HYDROCHLORIDE 40 MG: 10 INJECTION, SOLUTION INFILTRATION; PERINEURAL at 09:25

## 2023-05-16 RX ADMIN — PROPOFOL 60 MG: 10 INJECTION, EMULSION INTRAVENOUS at 09:25

## 2023-05-16 RX ADMIN — SODIUM CHLORIDE, POTASSIUM CHLORIDE, SODIUM LACTATE AND CALCIUM CHLORIDE: 600; 310; 30; 20 INJECTION, SOLUTION INTRAVENOUS at 08:30

## 2023-05-16 ASSESSMENT — PAIN - FUNCTIONAL ASSESSMENT: PAIN_FUNCTIONAL_ASSESSMENT: 0-10

## 2023-05-16 NOTE — H&P
Patient Name: Laurie Borrero  : 1955  MRN: [id number]  DATE: 23    Allergies: Allergies   Allergen Reactions    Lisinopril Cough        ENDOSCOPY  History and Physical    Procedure:    [] Diagnostic Colonoscopy       [] Screening Colonoscopy  [x] EGD      [] ERCP      [] EUS       [] Other    [x] Previous office notes/History and Physical reviewed from the patients chart. Please see EMR for further details of HPI. I have examined the patient's status immediately prior to the procedure and:      Indications/HPI:    [x]Variceal screening    Anesthesia:   [x] MAC [] Moderate Sedation   [] General   [] None     ROS: 12 pt Review of Symptoms was negative unless mentioned above    Medications:   Prior to Admission medications    Medication Sig Start Date End Date Taking?  Authorizing Provider   colchicine (COLCRYS) 0.6 MG tablet Take 1 tablet by mouth 2 times daily   Yes Historical Provider, MD   furosemide (LASIX) 40 MG tablet Take 1 tablet by mouth daily For 30 Days    Historical Provider, MD   spironolactone (ALDACTONE) 100 MG tablet Take 1 tablet by mouth daily    Historical Provider, MD   Cholecalciferol (VITAMIN D) 25 MCG TABS Take 1 tablet by mouth daily 23   FADIA Novak   allopurinol (ZYLOPRIM) 300 MG tablet Take 1 tablet by mouth every other day    Historical Provider, MD   atorvastatin (LIPITOR) 20 MG tablet Take 1 tablet by mouth daily 16   FADIA Torres   metoprolol (LOPRESSOR) 100 MG tablet Take 1 tablet by mouth 2 times daily    Historical Provider, MD       Past Medical History:  Past Medical History:   Diagnosis Date    AA (alcohol abuse)     since     Chronic kidney disease (CKD), stage III (moderate) ([facility] 2011    sees Dr. Lory Reid    COVID-19 2022    Diabetes mellitus (HCC)     NIDDM    Diastolic CHF ([facility]     History of CVA (cerebrovascular accident) 2011    Hyperkalemia 2011    secondary to aldactone and zestril

## 2023-05-16 NOTE — OP NOTE
Endoscopic Procedure Note    Patient: Rachell Salinas : 1955  Med Rec#: [id number] Acc#: [id number]     Primary Care Provider Jesus Calix  Referring Provider:     Endoscopist: Alisson Weldon MD    Date of Procedure:  2023    Procedure:   1. EGD     Indications:   1. Variceal screening  2. Hx of chronic liver disease    Anesthesia:  Sedation was administered by anesthesia who monitored the patient during the procedure. Estimated Blood Loss: minimal    Procedure:   After reviewing the patient's chart and obtaining informed consent, the patient was placed in the left lateral decubitus position. A forward-viewing Olympus endoscope was lubricated and inserted through the mouth into the oropharynx. Under direct visualization, the upper esophagus was intubated. The scope was advanced to the level of the third portion of duodenum. Scope was slowly withdrawn with careful inspection of the mucosal surfaces. The scope was retroflexed for inspection of the gastric fundus and incisura. Findings and maneuvers are listed in impression below. The patient tolerated the procedure well. The scope was removed. There were no immediate complications. Findings:   Esophagus: normal  There is no hiatal hernia present. No varices noted    Stomach:  normal. No gastric varices seen. Duodenum: normal      IMPRESSION:  1. No evidence of esophageal or gastric varices. RECOMMENDATIONS:    1. Repeat EGD in 3 yrs for screening  2. Follow up as planned in GI office. The results were discussed with the patient and family. A copy of the images obtained were given to the patient.      Erin Gooden MD  2023  9:37 AM

## 2023-05-16 NOTE — DISCHARGE INSTRUCTIONS
RECOMMENDATIONS:    1. Repeat EGD in 3 yrs for screening  2. Follow up as planned in GI office. Upper GI Endoscopy: What to Expect at [address] had an upper GI endoscopy. Your doctor used a thin, lighted tube that bends to look at the inside of your esophagus, your stomach, and the first part of the small intestine, called the duodenum. How can you care for yourself at home? Activity   Rest as much as you need to after you go home. You should be able to go back to your usual activities the day after the test.  Due to anesthesia, no driving or operating equipment for 24 hours. Diet   Follow your doctor's directions for eating after the test.  Drink plenty of fluids (unless your doctor has told you not to). Medications   If you have a sore throat the day after the test, use an over-the-counter spray to numb your throat. When should you call for help? Call 911 anytime you think you may need emergency care. For example, call if:    You passed out (lost consciousness). You have trouble breathing. You pass maroon or bloody stools. Call your doctor now or seek immediate medical care if:    You have pain that does not get better after your take pain medicine. You have new or worse belly pain. You have blood in your stools. You are sick to your stomach and cannot keep fluids down. You have a fever. You cannot pass stools or gas. Watch closely for changes in your health, and be sure to contact your doctor if:    Your throat still hurts after a day or two. You do not get better as expected.

## 2023-05-16 NOTE — ANESTHESIA POSTPROCEDURE EVALUATION
Department of Anesthesiology  Postprocedure Note    Patient: Umang Leo  MRN: [id number]  YOB: 1955  Date of evaluation: 5/16/2023      Procedure Summary     Date: 05/16/23 Room / Location: [address]    Anesthesia Start: 5703 Anesthesia Stop:     Procedure: EGD BIOPSY Diagnosis:       Hx of esophageal varices      (Hx of esophageal varices [Z87.19])    Surgeons: Princess Dany MD Responsible Provider: FADIA Frank CRNA    Anesthesia Type: general, TIVA ASA Status: 3          Anesthesia Type: No value filed.     Britney Phase I:      Britney Phase II:        Anesthesia Post Evaluation    Patient location during evaluation: bedside  Patient participation: complete - patient participated  Level of consciousness: sleepy but conscious  Pain score: 0  Airway patency: patent  Nausea & Vomiting: no nausea and no vomiting  Complications: no  Cardiovascular status: hemodynamically stable and blood pressure returned to baseline  Respiratory status: acceptable and nasal cannula  Hydration status: stable

## 2023-06-02 ENCOUNTER — TELEPHONE (OUTPATIENT)
Dept: GASTROENTEROLOGY | Age: 68
End: 2023-06-02

## 2023-06-02 NOTE — TELEPHONE ENCOUNTER
6-2-23  JOSE RAMON FROM PCP OFFICE CALLED TO LET US KNOW THAT THEY ARE HAVING RONALD DO BLOOD WORK EVERY WK. PT IS LOSING WT, NOT ABLE TO EAT AND IS VERY WEEK. THEY ARE CONCERNED, HE IS GOING DOWN FAST. PCP IS GOING TO DO A HOME CHECK THIS WEEK-END. PT [facility]. HE DID HAVE P T BUT INSURANCE WILL NOT PAY NOW. LABS ARE SCANNED IN MEDIA FOR CT APRN TO REVIEW. ALSO ROUTING THIS TO HER SO SHE IS AWARE OF WHAT IS GOING ON.     I DID LET JOSE RAMON KNOW THE NUMBER TO [facility], SHE IS GOING TO CALL TO SEE IF SHE CAN MOVE IT ALONG ON SCHEDULING THE PT, ASAP

## 2023-06-19 ENCOUNTER — TELEPHONE (OUTPATIENT)
Dept: GASTROENTEROLOGY | Age: 68
End: 2023-06-19

## 2023-06-19 NOTE — TELEPHONE ENCOUNTER
----- Message from FADIA Kennedy sent at 6/19/2023  1:09 PM CDT -----  Liver ultrasound shows early or mild cirrhosis, mildly enlarged spleen.  No suspicious liver lesions   Will repeat ultrasound in 6 months

## 2023-08-14 ENCOUNTER — TELEPHONE (OUTPATIENT)
Dept: GASTROENTEROLOGY | Age: 68
End: 2023-08-14

## 2023-08-14 NOTE — TELEPHONE ENCOUNTER
08- Received labs form Dr Teofilo Eaton office for his CBC and Cmp that was done on 08-    Scanned into media and sent to CT APRN

## 2024-02-14 RX ORDER — AMILORIDE HYDROCHLORIDE 5 MG/1
5 TABLET ORAL DAILY
COMMUNITY

## 2024-02-14 RX ORDER — PREDNISOLONE 5 MG/1
5 TABLET ORAL PRN
COMMUNITY

## 2024-02-14 RX ORDER — ACETAMINOPHEN 500 MG
500 TABLET ORAL EVERY 6 HOURS PRN
COMMUNITY

## 2024-02-22 ENCOUNTER — OFFICE VISIT (OUTPATIENT)
Dept: GASTROENTEROLOGY | Age: 69
End: 2024-02-22
Payer: MEDICARE

## 2024-02-22 VITALS
DIASTOLIC BLOOD PRESSURE: 84 MMHG | HEART RATE: 78 BPM | OXYGEN SATURATION: 98 % | BODY MASS INDEX: 28.49 KG/M2 | WEIGHT: 199 LBS | SYSTOLIC BLOOD PRESSURE: 134 MMHG | HEIGHT: 70 IN

## 2024-02-22 DIAGNOSIS — K70.30 ALCOHOLIC CIRRHOSIS OF LIVER WITHOUT ASCITES (HCC): Primary | ICD-10-CM

## 2024-02-22 DIAGNOSIS — R74.8 ELEVATED LIVER ENZYMES: ICD-10-CM

## 2024-02-22 PROCEDURE — 3017F COLORECTAL CA SCREEN DOC REV: CPT | Performed by: NURSE PRACTITIONER

## 2024-02-22 PROCEDURE — 99213 OFFICE O/P EST LOW 20 MIN: CPT | Performed by: NURSE PRACTITIONER

## 2024-02-22 PROCEDURE — 3079F DIAST BP 80-89 MM HG: CPT | Performed by: NURSE PRACTITIONER

## 2024-02-22 PROCEDURE — 3075F SYST BP GE 130 - 139MM HG: CPT | Performed by: NURSE PRACTITIONER

## 2024-02-22 PROCEDURE — G8419 CALC BMI OUT NRM PARAM NOF/U: HCPCS | Performed by: NURSE PRACTITIONER

## 2024-02-22 PROCEDURE — 1123F ACP DISCUSS/DSCN MKR DOCD: CPT | Performed by: NURSE PRACTITIONER

## 2024-02-22 PROCEDURE — G8484 FLU IMMUNIZE NO ADMIN: HCPCS | Performed by: NURSE PRACTITIONER

## 2024-02-22 PROCEDURE — G8427 DOCREV CUR MEDS BY ELIG CLIN: HCPCS | Performed by: NURSE PRACTITIONER

## 2024-02-22 PROCEDURE — 1036F TOBACCO NON-USER: CPT | Performed by: NURSE PRACTITIONER

## 2024-02-22 NOTE — PROGRESS NOTES
Subjective:     Patient ID: Jeffery Campbell is a 69 y.o. male  PCP: Aundrea Gagnon  Referring Provider: Tomasz Higgins MD    HPI  Patient presents to the office today with the following complaints: Abnormal Lab and Cirrhosis      Patient seen in the office today accompanied by his son for follow up on cirrhosis   Reports he is \"doing great\" at this time   Reports his kidney function is the best it has been in a long time   He was seen by hepatology and was told he was not a candidate for transplant due to kidney failure raising his MELD score   Is has labs ordered and will get these done at [facility]    He is seeing his renal doctor in April   Reports his \"swelling\" has been down in his abd and legs   Reports his bowels are moving well     Colonoscopy and EGD are up to date       Assessment:     1. Alcoholic cirrhosis of liver without ascites (HCC)  2. Elevated liver enzymes           Plan:   Labs today   Follow up in 6 months with cirrhosis labs prior to OV   Orders  No orders of the defined types were placed in this encounter.    Medications  No orders of the defined types were placed in this encounter.        Patient History:     Past Medical History:   Diagnosis Date    AA (alcohol abuse)     since 1994    Chronic kidney disease (CKD), stage III (moderate) (HCC) 06/14/2011    sees Dr. Al RODRIGUEZ-19 12/27/2022    Diabetes mellitus (HCC)     NIDDM    Diastolic CHF (HCC) 06/14/2011    Gout     History of CVA (cerebrovascular accident) 06/14/2011    Hyperkalemia 06/14/2011    secondary to aldactone and zestril    Hyperlipemia     cholesterol management, Aundrea LOAIZA    Hypertension     Left ventricular hypertrophy 06/14/2011    Lung nodule     calcified nodule RML    Tobacco abuse, in remission     since 1994    Vertebral artery stenosis 06/14/2011    Vitamin D deficiency        Past Surgical History:   Procedure Laterality Date    COLONOSCOPY  12/2022    normal per pt, 10yr recall    UPPER

## 2024-02-28 ASSESSMENT — ENCOUNTER SYMPTOMS
ANAL BLEEDING: 0
RECTAL PAIN: 0
BLOOD IN STOOL: 0
VOMITING: 0
NAUSEA: 0
DIARRHEA: 0
SHORTNESS OF BREATH: 0
ABDOMINAL DISTENTION: 0
CONSTIPATION: 0
CHOKING: 0
TROUBLE SWALLOWING: 0
COUGH: 0
ABDOMINAL PAIN: 0

## 2024-03-01 NOTE — RESULT ENCOUNTER NOTE
Tumor marker is negative  Liver enzymes slightly elevated will continue to monitor   PT/INR normal   MELD score 14

## 2024-03-04 ENCOUNTER — TELEPHONE (OUTPATIENT)
Dept: GASTROENTEROLOGY | Age: 69
End: 2024-03-04

## 2024-03-04 NOTE — TELEPHONE ENCOUNTER
----- Message from FADIA Ryan sent at 3/1/2024  4:19 PM CST -----  Tumor marker is negative  Liver enzymes slightly elevated will continue to monitor   PT/INR normal   MELD score 14

## 2024-03-04 NOTE — TELEPHONE ENCOUNTER
3.4.24  Thanks Blanca,  pt has been notified of results and recommendations.      Results routed to PCP

## 2024-08-22 ENCOUNTER — OFFICE VISIT (OUTPATIENT)
Dept: GASTROENTEROLOGY | Age: 69
End: 2024-08-22
Payer: MEDICARE

## 2024-08-22 VITALS
BODY MASS INDEX: 31.64 KG/M2 | WEIGHT: 221 LBS | HEART RATE: 60 BPM | OXYGEN SATURATION: 99 % | HEIGHT: 70 IN | DIASTOLIC BLOOD PRESSURE: 90 MMHG | SYSTOLIC BLOOD PRESSURE: 155 MMHG

## 2024-08-22 DIAGNOSIS — K70.30 ALCOHOLIC CIRRHOSIS OF LIVER WITHOUT ASCITES (HCC): Primary | ICD-10-CM

## 2024-08-22 PROCEDURE — 3077F SYST BP >= 140 MM HG: CPT | Performed by: NURSE PRACTITIONER

## 2024-08-22 PROCEDURE — G8419 CALC BMI OUT NRM PARAM NOF/U: HCPCS | Performed by: NURSE PRACTITIONER

## 2024-08-22 PROCEDURE — 1036F TOBACCO NON-USER: CPT | Performed by: NURSE PRACTITIONER

## 2024-08-22 PROCEDURE — 99214 OFFICE O/P EST MOD 30 MIN: CPT | Performed by: NURSE PRACTITIONER

## 2024-08-22 PROCEDURE — 3080F DIAST BP >= 90 MM HG: CPT | Performed by: NURSE PRACTITIONER

## 2024-08-22 PROCEDURE — 3017F COLORECTAL CA SCREEN DOC REV: CPT | Performed by: NURSE PRACTITIONER

## 2024-08-22 PROCEDURE — 1123F ACP DISCUSS/DSCN MKR DOCD: CPT | Performed by: NURSE PRACTITIONER

## 2024-08-22 PROCEDURE — G8427 DOCREV CUR MEDS BY ELIG CLIN: HCPCS | Performed by: NURSE PRACTITIONER

## 2024-08-22 RX ORDER — FUROSEMIDE 20 MG
1 TABLET ORAL DAILY PRN
COMMUNITY

## 2024-08-22 NOTE — PROGRESS NOTES
Subjective:     Patient ID: Jeffery Campbell is a 69 y.o. male  PCP: Aundrea Gagnon APRN - NP  Referring Provider: No ref. provider found    HPI  Patient presents to the office today with the following complaints: 6 Month Follow-Up      Patient seen in the office today for follow up on liver cirrhosis, he is accompanied by his son  States he is doing well, he denies any complaints  States his bowels are moving well, denies abd swelling and denies abd pain     Labs reviewed and these are in Epic under media tab    Denies any needs at this time   Assessment:     1. Alcoholic cirrhosis of liver without ascites (HCC)  -     AFP Tumor Marker; Future  -     Basic Metabolic Panel; Future  -     Hepatic Function Panel; Future  -     Protime-INR; Future  -     US LIVER; Future       Review of Systems   Constitutional:  Negative for activity change, appetite change, fatigue, fever and unexpected weight change.   HENT:  Negative for trouble swallowing.    Respiratory:  Negative for cough, choking and shortness of breath.    Cardiovascular:  Negative for chest pain.   Gastrointestinal:  Negative for abdominal distention, abdominal pain, anal bleeding, blood in stool, constipation, diarrhea, nausea, rectal pain and vomiting.   Allergic/Immunologic: Negative for food allergies.   All other systems reviewed and are negative.      Plan:   Continue current medication  Follow up in 6 months with liver ultrasound and cirrhosis labs   Orders  Orders Placed This Encounter   Procedures    US LIVER     Standing Status:   Future     Standing Expiration Date:   8/22/2025     Order Specific Question:   Reason for exam:     Answer:   hx liver cirrhosis    AFP Tumor Marker     Standing Status:   Future     Standing Expiration Date:   8/22/2025    Basic Metabolic Panel     Standing Status:   Future     Standing Expiration Date:   8/22/2025    Hepatic Function Panel     Standing Status:   Future     Standing Expiration Date:   8/22/2025     Right Ear: External ear normal.      Left Ear: External ear normal.      Nose: Nose normal.   Eyes:      General: No scleral icterus.        Right eye: No discharge.         Left eye: No discharge.      Conjunctiva/sclera: Conjunctivae normal.      Pupils: Pupils are equal, round, and reactive to light.   Cardiovascular:      Rate and Rhythm: Normal rate and regular rhythm.      Heart sounds: Normal heart sounds. No murmur heard.  Pulmonary:      Effort: Pulmonary effort is normal. No respiratory distress.      Breath sounds: Normal breath sounds. No wheezing or rales.   Abdominal:      General: Bowel sounds are normal. There is no distension.      Palpations: Abdomen is soft. There is no mass.      Tenderness: There is no abdominal tenderness. There is no guarding or rebound.   Musculoskeletal:         General: Normal range of motion.      Cervical back: Normal range of motion and neck supple.   Skin:     General: Skin is warm and dry.      Coloration: Skin is not pale.   Neurological:      Mental Status: He is alert and oriented to person, place, and time.   Psychiatric:         Behavior: Behavior normal.

## 2024-08-29 ASSESSMENT — ENCOUNTER SYMPTOMS
VOMITING: 0
ABDOMINAL DISTENTION: 0
DIARRHEA: 0
CONSTIPATION: 0
ABDOMINAL PAIN: 0
SHORTNESS OF BREATH: 0
ANAL BLEEDING: 0
COUGH: 0
NAUSEA: 0
BLOOD IN STOOL: 0
RECTAL PAIN: 0
CHOKING: 0
TROUBLE SWALLOWING: 0

## 2024-12-13 ENCOUNTER — TELEPHONE (OUTPATIENT)
Dept: GASTROENTEROLOGY | Age: 69
End: 2024-12-13

## 2024-12-13 NOTE — TELEPHONE ENCOUNTER
12.13.24  Called Aysha back to let her know that it was too soon to get those done.  I will fax and inform pt mid to end of Jan.    Aysha voiced understanding and appreciation for the call back.

## 2025-01-27 ENCOUNTER — TELEPHONE (OUTPATIENT)
Dept: GASTROENTEROLOGY | Age: 70
End: 2025-01-27

## 2025-01-27 NOTE — TELEPHONE ENCOUNTER
1.27.25  [facility] for a call back to see if pt is still want to have his testing done at [facility]?      Pt's wife called back and yes [facility].  Order have been faxed [phone]  Fax confirmation scanned in media and attached to this encounter.      Once scheduled, Frida will call back the day and time.      Frida called back the the US is sched 2-3-25 @8am with labs after that.

## 2025-01-27 NOTE — TELEPHONE ENCOUNTER
----- Message from KURTIS GALVAN MA sent at 12/13/2024  2:32 PM CST -----  Regarding: testing  2/25/25 appt-   pt wife requested the lab and US orders sent to [facility]

## 2025-02-05 NOTE — RESULT ENCOUNTER NOTE
Liver enzymes remain slightly elevated but stable   AFP tumor marker is normal   PT/INR is normal     Liver ultrasound   Shows normal liver and tiny gallstones noted

## 2025-02-25 ENCOUNTER — OFFICE VISIT (OUTPATIENT)
Dept: GASTROENTEROLOGY | Age: 70
End: 2025-02-25
Payer: MEDICARE

## 2025-02-25 VITALS
HEART RATE: 55 BPM | WEIGHT: 233 LBS | OXYGEN SATURATION: 96 % | SYSTOLIC BLOOD PRESSURE: 150 MMHG | HEIGHT: 70 IN | BODY MASS INDEX: 33.36 KG/M2 | DIASTOLIC BLOOD PRESSURE: 80 MMHG

## 2025-02-25 DIAGNOSIS — K70.30 ALCOHOLIC CIRRHOSIS OF LIVER WITHOUT ASCITES (HCC): Primary | ICD-10-CM

## 2025-02-25 PROCEDURE — 99213 OFFICE O/P EST LOW 20 MIN: CPT | Performed by: NURSE PRACTITIONER

## 2025-02-25 PROCEDURE — G8417 CALC BMI ABV UP PARAM F/U: HCPCS | Performed by: NURSE PRACTITIONER

## 2025-02-25 PROCEDURE — 3077F SYST BP >= 140 MM HG: CPT | Performed by: NURSE PRACTITIONER

## 2025-02-25 PROCEDURE — 3079F DIAST BP 80-89 MM HG: CPT | Performed by: NURSE PRACTITIONER

## 2025-02-25 PROCEDURE — 3017F COLORECTAL CA SCREEN DOC REV: CPT | Performed by: NURSE PRACTITIONER

## 2025-02-25 PROCEDURE — 1159F MED LIST DOCD IN RCRD: CPT | Performed by: NURSE PRACTITIONER

## 2025-02-25 PROCEDURE — 1036F TOBACCO NON-USER: CPT | Performed by: NURSE PRACTITIONER

## 2025-02-25 PROCEDURE — G8427 DOCREV CUR MEDS BY ELIG CLIN: HCPCS | Performed by: NURSE PRACTITIONER

## 2025-02-25 PROCEDURE — 1123F ACP DISCUSS/DSCN MKR DOCD: CPT | Performed by: NURSE PRACTITIONER

## 2025-02-25 NOTE — PROGRESS NOTES
Subjective:     Patient ID: Jeffery Campbell is a 70 y.o. male  PCP: Aundrea Gagnon APRN - NP  Referring Provider: No ref. provider found    HPI  Patient presents to the office today with the following complaints: 6 Month Follow-Up    Patient seen in the office today for follow up on liver cirrhosis he is accompanied by is son  Reports he is doing very well  Denies abd swelling, abd pain, or confusion and states his bowels are moving well   He denies needs at this time     Labs and imaging reviewed, these are in Epic    MELD 16    Colonoscopy and EGD are up to date     Assessment:     1. Alcoholic cirrhosis of liver without ascites (HCC)       Review of Systems   Constitutional:  Negative for activity change, appetite change, fatigue, fever and unexpected weight change.   HENT:  Negative for trouble swallowing.    Respiratory:  Negative for cough, choking and shortness of breath.    Cardiovascular:  Negative for chest pain.   Gastrointestinal:  Negative for abdominal distention, abdominal pain, anal bleeding, blood in stool, constipation, diarrhea, nausea, rectal pain and vomiting.   Allergic/Immunologic: Negative for food allergies.   All other systems reviewed and are negative.      Plan:   Follow up in 6 months with liver labs prior to OV     Orders  No orders of the defined types were placed in this encounter.    Medications  No orders of the defined types were placed in this encounter.        Patient History:     Past Medical History:   Diagnosis Date    AA (alcohol abuse)     since 1994    Chronic kidney disease (CKD), stage III (moderate) (HCC) 06/14/2011    sees Dr. Al RODRIGUEZ-19 12/27/2022    Diabetes mellitus (HCC)     NIDDM    Diastolic CHF (HCC) 06/14/2011    Gout     History of CVA (cerebrovascular accident) 06/14/2011    Hyperkalemia 06/14/2011    secondary to aldactone and zestril    Hyperlipemia     cholesterol management, Aundrea LOAIZA    Hypertension     Left ventricular hypertrophy 06/14/2011

## 2025-06-23 ENCOUNTER — TELEPHONE (OUTPATIENT)
Dept: GASTROENTEROLOGY | Age: 70
End: 2025-06-23

## 2025-08-11 DIAGNOSIS — R74.01 TRANSAMINITIS: Primary | ICD-10-CM

## 2025-08-11 DIAGNOSIS — K70.30 ALCOHOLIC CIRRHOSIS OF LIVER WITHOUT ASCITES (HCC): Primary | ICD-10-CM

## 2025-08-12 ENCOUNTER — TELEPHONE (OUTPATIENT)
Dept: GASTROENTEROLOGY | Age: 70
End: 2025-08-12

## (undated) DEVICE — FORCEPS BX L240CM JAW DIA2.8MM L CAP W/ NDL MIC MESH TOOTH

## (undated) DEVICE — ENDO KIT,LOURDES HOSPITAL: Brand: MEDLINE INDUSTRIES, INC.